# Patient Record
Sex: FEMALE | Race: BLACK OR AFRICAN AMERICAN
[De-identification: names, ages, dates, MRNs, and addresses within clinical notes are randomized per-mention and may not be internally consistent; named-entity substitution may affect disease eponyms.]

---

## 2018-02-02 ENCOUNTER — HOSPITAL ENCOUNTER (INPATIENT)
Dept: HOSPITAL 74 - JER | Age: 63
LOS: 12 days | Discharge: NURSING, REHAB OR LONG-TERM CARE | DRG: 871 | End: 2018-02-14
Attending: INTERNAL MEDICINE | Admitting: HOSPITALIST
Payer: COMMERCIAL

## 2018-02-02 VITALS — BODY MASS INDEX: 38.4 KG/M2

## 2018-02-02 DIAGNOSIS — K21.9: ICD-10-CM

## 2018-02-02 DIAGNOSIS — I12.0: ICD-10-CM

## 2018-02-02 DIAGNOSIS — M46.28: ICD-10-CM

## 2018-02-02 DIAGNOSIS — I69.351: ICD-10-CM

## 2018-02-02 DIAGNOSIS — N18.6: ICD-10-CM

## 2018-02-02 DIAGNOSIS — L89.154: ICD-10-CM

## 2018-02-02 DIAGNOSIS — A41.9: Primary | ICD-10-CM

## 2018-02-02 DIAGNOSIS — E83.52: ICD-10-CM

## 2018-02-02 DIAGNOSIS — E11.42: ICD-10-CM

## 2018-02-02 DIAGNOSIS — G40.909: ICD-10-CM

## 2018-02-02 DIAGNOSIS — N17.9: ICD-10-CM

## 2018-02-02 DIAGNOSIS — E11.22: ICD-10-CM

## 2018-02-02 DIAGNOSIS — D72.829: ICD-10-CM

## 2018-02-02 DIAGNOSIS — G92: ICD-10-CM

## 2018-02-02 DIAGNOSIS — R41.82: ICD-10-CM

## 2018-02-02 DIAGNOSIS — D50.9: ICD-10-CM

## 2018-02-02 DIAGNOSIS — E87.1: ICD-10-CM

## 2018-02-02 DIAGNOSIS — A04.72: ICD-10-CM

## 2018-02-02 LAB
ALBUMIN SERPL-MCNC: 2.8 G/DL (ref 3.4–5)
ALP SERPL-CCNC: 160 U/L (ref 45–117)
ALT SERPL-CCNC: 29 U/L (ref 12–78)
ANION GAP SERPL CALC-SCNC: 12 MMOL/L (ref 8–16)
APTT BLD: 36.5 SECONDS (ref 26.9–34.4)
AST SERPL-CCNC: 36 U/L (ref 15–37)
BASOPHILS # BLD: 0.9 % (ref 0–2)
BILIRUB SERPL-MCNC: 0.4 MG/DL (ref 0.2–1)
BUN SERPL-MCNC: 24 MG/DL (ref 7–18)
CALCIUM SERPL-MCNC: 9.2 MG/DL (ref 8.5–10.1)
CHLORIDE SERPL-SCNC: 92 MMOL/L (ref 98–107)
CO2 SERPL-SCNC: 28 MMOL/L (ref 21–32)
COHGB MFR BLD: 2 GM% (ref 0.5–2)
CREAT SERPL-MCNC: 2.3 MG/DL (ref 0.55–1.02)
DEPRECATED RDW RBC AUTO: 19.4 % (ref 11.6–15.6)
EOSINOPHIL # BLD: 1.3 % (ref 0–4.5)
GLUCOSE SERPL-MCNC: 83 MG/DL (ref 74–106)
HCT VFR BLD CALC: 22.9 % (ref 32.4–45.2)
HGB BLD-MCNC: 7.3 GM/DL (ref 10.7–15.3)
INR BLD: 1.36 (ref 0.82–1.09)
LYMPHOCYTES # BLD: 14.3 % (ref 8–40)
MCH RBC QN AUTO: 27.8 PG (ref 25.7–33.7)
MCHC RBC AUTO-ENTMCNC: 31.8 G/DL (ref 32–36)
MCV RBC: 87.5 FL (ref 80–96)
MONOCYTES # BLD AUTO: 4.3 % (ref 3.8–10.2)
NEUTROPHILS # BLD: 79.2 % (ref 42.8–82.8)
PH BLDV: 7.36 [PH] (ref 7.32–7.42)
PLATELET # BLD AUTO: 564 K/MM3 (ref 134–434)
PMV BLD: 7.6 FL (ref 7.5–11.1)
POTASSIUM SERPLBLD-SCNC: 3.5 MMOL/L (ref 3.5–5.1)
PROT SERPL-MCNC: 7.9 G/DL (ref 6.4–8.2)
PT PNL PPP: 15.4 SEC (ref 9.98–11.88)
RBC # BLD AUTO: 2.62 M/MM3 (ref 3.6–5.2)
SODIUM SERPL-SCNC: 132 MMOL/L (ref 136–145)
VENOUS PC02: 54.5 MMHG (ref 38–52)
VENOUS PO2: 33.8 MMHG (ref 28–48)
WBC # BLD AUTO: 10 K/MM3 (ref 4–10)

## 2018-02-02 PROCEDURE — P9038 RBC IRRADIATED: HCPCS

## 2018-02-02 PROCEDURE — G0480 DRUG TEST DEF 1-7 CLASSES: HCPCS

## 2018-02-02 PROCEDURE — P9058 RBC, L/R, CMV-NEG, IRRAD: HCPCS

## 2018-02-02 RX ADMIN — LEVETIRACETAM SCH: 100 SOLUTION ORAL at 23:30

## 2018-02-02 NOTE — HP
CHIEF COMPLAINT: facial grimacing , motor movement 





PCP:NO pcp (previous one Yohana Garcia [phone])





HISTORY OF PRESENT ILLNESS:


The patient is a 62 year old female with a significant PMH of ESRD (full 

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

, iron-deficiency anemia, Type 2 diabetes, enterocolitis d/t Clostridium 

difficile, GERD, and epilepsy who presents to the emergency department after 

rapid response from wound care today. The son is at bedside and providing most 

of the history. As per the son, the patient finished dialysis this morning and 

then went to a wound care appointment. At wound care, the patient started 

having facial movements and the son says these facial movements usually happen 

when something neurologic is going on. The son notes that the patient has not 

had any other changes in her mental status and was at her normal state of 

health yesterday. pt has a H/o schizophrenai and stop her psych meds 6 months 

ago.


 





ER course was notable for:


(1) CXR 


(2)Head CT 


(3)CBC, CMP , LA 


(4)Pan cx 


(5) vanco/zosyn





Recent Travel:denies 





PAST MEDICAL HISTORY: 


ESRD, HTN, SVC(with embolism and hemorrhage ), iron deficiency anemia , Type II 

DM, Enetrocolitis C.diff, epilepsy, Stage IV decupetus ulcer. 








Social History:


Smoking:denies 


Alcohol:denies 


Drugs: denies 





Family History:


Allergies





No Known Allergies Allergy (Verified 02/02/18 12:00)


 








HOME MEDICATIONS:


 Home Medications











 Medication  Instructions  Recorded


 


Acetaminophen 650 mg GT Q6H PRN 02/02/18


 


Amlodipine Besylate [Norvasc -] 2.5 mg GT DAILY 02/02/18


 


Apixaban [Eliquis] 5 mg GT BID 02/02/18


 


Collagenase Clostridium Hist. 1 applic TP DAILY 02/02/18





[Santyl]  


 


Insulin (LOG) Aspart [NovoLOG -] 0 units SQ BID 02/02/18


 


Insulin NPH Hum/Reg Insulin Hm 8 unit SQ DAILY 02/02/18





[Novolin 70-30 100 Unit/ml Vial]  


 


Ipratropium 0.02% Nebulizer 1 neb NEB Q4H 02/02/18





[Atrovent]  


 


Lactobacillus Acidophilus 1 each GT BID 02/02/18





[Acidophilus]  


 


Levetiracetam [Keppra Oral 500 mg GT Q12H 02/02/18





Solution -]  


 


Nystatin Powder [Nystop Topical 60 gm TP DAILY 02/02/18





Powder -]  


 


Paricalcitol 2 mcg IV DAILY 02/02/18


 


Polyvinyl Alcohol [Artificial 15 ml OP BID 02/02/18





Tears]  


 


Sevelamer Carbonate 800 mg GT Q8H 02/02/18


 


Vitamin B Complex/Folic Acid 2,000 mcg GT DAILY 02/02/18





[B-Stress Capsules]  








REVIEW OF SYSTEMS: obtained from son 


CONSTITUTIONAL: 


Absent:  fever, chills, diaphoresis, generalized weakness, malaise, loss of 

appetite, weight change


HEENT: 


Absent:  rhinorrhea, nasal congestion, throat pain, throat swelling, difficulty 

swallowing, mouth swelling, ear pain, eye pain, visual changes


CARDIOVASCULAR: 


Absent: chest pain, syncope, palpitations, irregular heart rate, lightheadedness

, peripheral edema


RESPIRATORY: 


Absent: cough, shortness of breath, dyspnea with exertion, orthopnea, wheezing, 

stridor, hemoptysis


GASTROINTESTINAL:


Absent: abdominal pain, abdominal distension, nausea, vomiting, diarrhea, 

constipation, melena, hematochezia


GENITOURINARY: 


Absent: dysuria, frequency, urgency, hesitancy, hematuria, flank pain, genital 

pain, anuria 


MUSCULOSKELETAL: 


Absent: myalgia, arthralgia, joint swelling, back pain, neck pain


SKIN: 


Absent: rash, itching, pallor


HEMATOLOGIC/IMMUNOLOGIC: 


Absent: easy bleeding, easy bruising, lymphadenopathy, frequent infections


ENDOCRINE:


Absent: unexplained weight gain, unexplained weight loss, heat intolerance, 

cold intolerance


NEUROLOGIC: 


Absent: headache, focal weakness or paresthesias, dizziness, unsteady gait, 

seizure, mental status changes, bladder or bowel incontinence


PSYCHIATRIC: 


Absent: anxiety, depression, suicidal or homicidal ideation, hallucinations.








PHYSICAL EXAMINATION


 Vital Signs - 24 hr











  02/02/18 02/02/18





  12:01 13:43


 


Temperature  100.0 F H


 


Pulse Rate 101 H 


 


Respiratory 22 





Rate  


 


Blood Pressure 126/68 


 


O2 Sat by Pulse 97 





Oximetry (%)  











GENERAL: awake , not alert not oriented , responce to verbal stimuli 


HEAD: Normal with no signs of trauma.


EYES:right pupil dilated non reactive deformed , left pupil reactive to light. 


ENT: dry mucous membrane 


NECK: supple 


LUNGS: Breath sounds equal, clear to auscultation bilaterally. No wheezes, and 

no crackles. No accessory muscle use.


HEART: Regular rate and rhythm, normal S1 and S2 ,systolic  murmur 2-3/6 LUSB, 

No rub or gallop.


ABDOMEN: Soft, nontender,  distended, normoactive bowel sounds, no guarding, no 

rebound, PEG in place 





UPPER EXTREMITIES: 1+ pulses, cold,  No cyanosis. No clubbing. No peripheral 

edema. fistula in left arm, IV line in right arm , 3/5 strength on the right , 

vs 5/5 strength on the left 


LOWER EXTREMITIES: 1+ pulses, cold , No calf tenderness. No peripheral edema. 

strength 3/5 on the right with 5/5 on the  left . 


NEUROLOGICAL: could not  perform due to AMS 





SKIN: Warm, dry, scar on the right arm, 10 cm x 8 cm dicupetus ulcer stage IV


 Laboratory Results - last 24 hr











  02/02/18 02/02/18 02/02/18





  13:25 14:39 14:39


 


WBC  Cancelled  


 


Corrected WBC (auto)  Cancelled  


 


RBC  Cancelled  


 


Hgb  Cancelled  


 


Hct  Cancelled  


 


MCV  Cancelled  


 


MCH  Cancelled  


 


MCHC  Cancelled  


 


RDW  Cancelled  


 


Plt Count  Cancelled  


 


MPV  Cancelled  


 


Neutrophils %  Cancelled  


 


Lymphocytes %  Cancelled  


 


Monocytes %  Cancelled  


 


Eosinophils %  Cancelled  


 


Basophils %  Cancelled  


 


Nucleated RBC %  Cancelled  


 


Platelet Estimate  Cancelled  


 


Platelet Comment  Cancelled  


 


PT with INR   15.40 H 


 


INR   1.36 H 


 


PTT (Actin FS)   36.5 H 


 


VBG pH    7.36


 


POC VBG pCO2    54.5 H


 


POC VBG pO2    33.8


 


Mixed VBG HCO3    30.1 H


 


Sodium   


 


Potassium   


 


Chloride   


 


Carbon Dioxide   


 


Anion Gap   


 


BUN   


 


Creatinine   


 


Creat Clearance w eGFR   


 


Random Glucose   


 


Lactic Acid   


 


Calcium   


 


Magnesium   


 


Total Bilirubin   


 


AST   


 


ALT   


 


Alkaline Phosphatase   


 


Creatine Kinase   


 


Creatine Kinase Index   


 


CK-MB (CK-2)   


 


Troponin I   


 


Total Protein   


 


Albumin   














  02/02/18 02/02/18 02/02/18





  14:39 14:39 15:00


 


WBC   


 


Corrected WBC (auto)   


 


RBC   


 


Hgb   


 


Hct   


 


MCV   


 


MCH   


 


MCHC   


 


RDW   


 


Plt Count   


 


MPV   


 


Neutrophils %   


 


Lymphocytes %   


 


Monocytes %   


 


Eosinophils %   


 


Basophils %   


 


Nucleated RBC %   


 


Platelet Estimate   


 


Platelet Comment   


 


PT with INR   


 


INR   


 


PTT (Actin FS)   


 


VBG pH   


 


POC VBG pCO2   


 


POC VBG pO2   


 


Mixed VBG HCO3   


 


Sodium  132 L  


 


Potassium  3.5  


 


Chloride  92 L  


 


Carbon Dioxide  28  


 


Anion Gap  12  


 


BUN  24 H  


 


Creatinine  2.3 H  


 


Creat Clearance w eGFR  21.49  


 


Random Glucose  83  


 


Lactic Acid    2.4 H*


 


Calcium  9.2  


 


Magnesium   Cancelled 


 


Total Bilirubin  0.4  


 


AST  36  


 


ALT  29  


 


Alkaline Phosphatase  160 H  


 


Creatine Kinase  448 H  


 


Creatine Kinase Index  1.2  


 


CK-MB (CK-2)  5.508 H  


 


Troponin I  < 0.02  Cancelled 


 


Total Protein  7.9  


 


Albumin  2.8 L  














  02/02/18





  15:00


 


WBC  10.0


 


Corrected WBC (auto) 


 


RBC  2.62 L


 


Hgb  7.3 L


 


Hct  22.9 L


 


MCV  87.5


 


MCH  27.8


 


MCHC  31.8 L


 


RDW  19.4 H


 


Plt Count  564 H


 


MPV  7.6


 


Neutrophils %  79.2


 


Lymphocytes %  14.3


 


Monocytes %  4.3


 


Eosinophils %  1.3


 


Basophils %  0.9


 


Nucleated RBC % 


 


Platelet Estimate 


 


Platelet Comment 


 


PT with INR 


 


INR 


 


PTT (Actin FS) 


 


VBG pH 


 


POC VBG pCO2 


 


POC VBG pO2 


 


Mixed VBG HCO3 


 


Sodium 


 


Potassium 


 


Chloride 


 


Carbon Dioxide 


 


Anion Gap 


 


BUN 


 


Creatinine 


 


Creat Clearance w eGFR 


 


Random Glucose 


 


Lactic Acid 


 


Calcium 


 


Magnesium 


 


Total Bilirubin 


 


AST 


 


ALT 


 


Alkaline Phosphatase 


 


Creatine Kinase 


 


Creatine Kinase Index 


 


CK-MB (CK-2) 


 


Troponin I 


 


Total Protein 


 


Albumin 





 CBC, BMP





 02/02/18 15:00 





 02/02/18 14:39 








CXR : weak inspiration with promenent mediastenum.





CT head : moderate atrophy , NO intracranial lesions or hemorrhage.  


ASSESSMENT/PLAN:


 62 year old female with a significant PMH of ESRD on HD, HTN, SVC syndrome 

complicated by embolism and hemorrhage, iron-deficiency anemia, Type 2 diabetes

, enterocolitis d/t Clostridium difficile, GERD, and epilepsy who presents to 

the emergency department after rapid response from wound care. was found to 

have sepsis ad admitted to med-surg for further evaluation.





# Sepsis likely 2/2 decubitus ulcer vs unknown source 


* Pan cx (blood, urine , sputum, wound )


* influenza swap rapid 


* Vanco/zosyn in ED 


* ID on board 


* repeat CBC, CMP 


* LA 2.4 repeat  LA 


* Tylenol for fever


* IV fluids boluses with 75 CC/hr maintenance 


* 


# AMS likely 2/2 sepsis vs volume depletion vs electrolytes imbalance vs 

seizure vs CVA 


* Head CT negative 


* H/O seizure contine home meds 


* replinished elctrolytes 


* IV fluids NS 2 L bolus , maintenance 100 CC/hr 


* 


# Hyponatremia 2/2 low oral intake 


* NS IV fluids 


* repeat lab 


* 


# ESSENCE on CKD on HD 2/2 sepsis vs pre renal 


* BUN/ CR 24/2.3 , had HD today 


* IV fluids, ABX 


* consult nephrology 


* Avoid nephrotoxic agenst 


* nephrology consult 


* c/w HD schedule 


* repeat BuN/Cr 


* 


# Stage IV decubitus ulcer 


* wound care 


* contiue abx 


* wound cx 


# Prolonges QT 


* avoid meds that cause prolongtion (zofran, levaquin, ...)


# HTN 


* hold home meds due to sepsis 


* 


# Type II DM 


* Hold home meds 


* ISS 


* BGM TID 


* npo for now , diabetic diet low sodium diet later 


* 


# seizure 


* Continue home meds


* Consult neurology 


* 


#  FEN 


* F: NS bolusses with maintenance 


* E: replenished , monitor 


* N: NPO , later diabetic , low sodium diet through PEG


* 


# Proph 


* DVT : SCDS both legs 


* GI: protonix 40 mg daily 


* 


# Dispo 


* Admit to Med-surg 








Visit type





- Emergency Visit


Emergency Visit: Yes


ED Registration Date: 02/02/18


Care time: The patient presented to the Emergency Department on the above date 

and was hospitalized for further evaluation of their emergent condition.





- New Patient


This patient is new to me today: Yes


Date on this admission: 02/04/18





- Critical Care


Critical Care patient: No

## 2018-02-02 NOTE — PDOC
History of Present Illness





- General


History Source: Family (son)


Exam Limitations: Clinical Condition





- History of Present Illness


Initial Comments: 





02/02/18 13:25


The patient is a 62 year old female with a significant PMH of ESRD (full 

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

, iron-deficiency anemia, Type 2 diabetes, enterocolitis d/t Clostridium 

difficile, GERD, and epilepsy who presents to the emergency department after 

rapid response from wound care today. The son is at bedside and providing most 

of the history. As per the son, the patient finished dialysis this morning and 

then went to a wound care appointment. At wound care, the patient started 

having facial movements and the son says these facial movements usually happen 

when something neurologic is going on. The son notes that the patient has not 

had any other changes in her mental status and was at her normal state of 

health yesterday. 


 


Allergies: NKA





<Shelley Lal - Last Filed: 02/02/18 13:25>





<Pilar Mancilla - Last Filed: 02/02/18 18:27>





- General


Chief Complaint: Altered Mental Status


Stated Complaint: AMS


Time Seen by Provider: 02/02/18 12:00





Past History





<Shelley Lal - Last Filed: 02/02/18 13:25>





- Past Medical History


Anemia: Yes


COPD: No


Diabetes: Yes


Dialysis: Yes


HTN: Yes


Psychiatric Problems: Yes (schizophrenia)





- Suicide/Smoking/Psychosocial Hx


Smoking History: Unknown if ever smoked





<Pilar Mancilla - Last Filed: 02/02/18 18:27>





- Past Medical History


Allergies/Adverse Reactions: 


 Allergies











Allergy/AdvReac Type Severity Reaction Status Date / Time


 


No Known Allergies Allergy   Verified 02/02/18 12:00











Home Medications: 


Ambulatory Orders





Acetaminophen 650 mg GT Q6H PRN 02/02/18 


Amlodipine Besylate [Norvasc -] 2.5 mg GT DAILY 02/02/18 


Apixaban [Eliquis] 5 mg GT BID 02/02/18 


Collagenase Clostridium Hist. [Santyl] 1 applic TP DAILY 02/02/18 


Insulin (LOG) Aspart [NovoLOG -] 0 units SQ BID 02/02/18 


Insulin NPH Hum/Reg Insulin Hm [Novolin 70-30 100 Unit/ml Vial] 8 unit SQ DAILY 

02/02/18 


Ipratropium 0.02% Nebulizer [Atrovent] 1 neb NEB Q4H 02/02/18 


Lactobacillus Acidophilus [Acidophilus] 1 each GT BID 02/02/18 


Levetiracetam [Keppra Oral Solution -] 500 mg GT Q12H 02/02/18 


Nystatin Powder [Nystop Topical Powder -] 60 gm TP DAILY 02/02/18 


Paricalcitol 2 mcg IV DAILY 02/02/18 


Polyvinyl Alcohol [Artificial Tears] 15 ml OP BID 02/02/18 


Sevelamer Carbonate 800 mg GT Q8H 02/02/18 


Vitamin B Complex/Folic Acid [B-Stress Capsules] 2,000 mcg GT DAILY 02/02/18 











**Review of Systems





- Review of Systems


Able to Perform ROS?: No





<Shelley Lal - Last Filed: 02/02/18 13:25>





*Physical Exam





- Vital Signs


 Last Vital Signs











Temp Pulse Resp BP Pulse Ox


 


    101 H  22   126/68   97 


 


    02/02/18 12:01  02/02/18 12:01  02/02/18 12:01  02/02/18 12:01














- Physical Exam


Comments: 





02/02/18 13:26


GENERAL: Awake, alert, and fully oriented, in no acute distress


HEAD: No signs of trauma


EYES: PERRLA, EOMI, sclera anicteric, conjunctiva clear


ENT: Auricles normal inspection, hearing grossly normal, nares patent, 

oropharynx clear without exudates. Moist mucosa


NECK: Normal ROM, supple, no lymphadenopathy, JVD, or masses


LUNGS: Breath sounds equal, clear to auscultation bilaterally.  No wheezes, and 

no crackles


HEART: Regular rate and rhythm, normal S1 and S2, no murmurs, rubs or gallops


ABDOMEN: Soft, nontender, normoactive bowel sounds.  No guarding, no rebound.  

No masses


EXTREMITIES: Normal range of motion, no edema.  No clubbing or cyanosis. No 

cords, erythema, or tenderness


NEUROLOGICAL:  Normal speech, cranial nerves intact, negative pronator drift, 5/

5 strength in all 4 extremities, normal sensation to light touch in all 4 

extremities, normal cerebellar exam, normal gait, normal reflexes and tone


SKIN: Warm, Dry, normal turgor, no rashes or lesions noted.





<Shelley Lal - Last Filed: 02/02/18 13:25>





- Vital Signs


 Last Vital Signs











Temp Pulse Resp BP Pulse Ox


 


    101 H  22   126/68   97 


 


    02/02/18 12:01  02/02/18 12:01  02/02/18 12:01  02/02/18 12:01














<Pilar Mancilla - Last Filed: 02/02/18 18:27>





ED Treatment Course





- LABORATORY


CBC & Chemistry Diagram: 


 02/02/18 15:00





 02/02/18 14:39





- RADIOLOGY


Radiology Studies Ordered: 














 Category Date Time Status


 


 HEAD CT WITHOUT CONTRAST [CT] Stat CT Scan  02/02/18 12:01 Ordered


 


 CHEST X-RAY PORTABLE* [RAD] Stat Radiology  02/02/18 12:00 Completed














<Pilar Mancilla - Last Filed: 02/02/18 18:27>





Medical Decision Making





- Medical Decision Making





02/02/18 14:17


62-year-old female history of SVC syndrome complicated by emboli and hemorrhage

, nursing home bound, stage IV sacral decubitus ulcer, epilepsy presents to the 

emergency department with altered mental status from wound clinic. Son notes a 

common symptoms of her face that he states are only present when something 

neurologic is occurring. Vitals remarkable for tachycardia to the low 100s. 

Exam remarkable left repetitive ace movements, stage IV sacral decubitus ulcer, 

and low-grade temperature to 100 rectally. Differential for her altered mental 

status includes but is not limited to infection versus seizures versus ischemia 

versus metabolic disarray. Will do a sepsis workup as well as a CTH and obtain 

cardiac enzymes for further evaluation and likely admit.





02/02/18 18:25


CT head with no acute change. Patient treated with Vanc and Zosyn for likely 

infection due to stage IV sacral decubitus ulcer. Patient has been signed out 

to resident Dr. Heredia in person in the ED and has been accepted for admission 

to a [facility] inpatient bed under attending Dr. Lugo.





Case discussed in detail with admitting physician including history, physical 

exam and ancillary studies.





Admitting physician has assumed care for the patient, will follow all pending 

diagnostics and will complete the evaluation and treatment.  





<Pilar Mancilla - Last Filed: 02/02/18 18:27>





*DC/Admit/Observation/Transfer





- Attestations


Scribe Attestion: 





02/02/18 13:26





Documentation prepared by Shelley Lal, acting as medical scribe for Pilar Mancilla MD.





<Shelley Lal - Last Filed: 02/02/18 13:25>





- Discharge Dispostion


Admit: Yes





- Attestations


Physician Attestion: 





02/02/18 18:27








I, Dr. Pilar Mancilla MD, attest that this document has been prepared under my 

direction and personally reviewed by me in its entirety.   I further attest, 

that it accurately reflects all work, treatment, procedures and medical decision

-making performed by me.  





<Pilar Mancilla - Last Filed: 02/02/18 18:27>


Diagnosis at time of Disposition: 


 Sepsis








- Discharge Dispostion


Condition at time of disposition: Stable

## 2018-02-03 LAB
ALBUMIN SERPL-MCNC: 2.6 G/DL (ref 3.4–5)
ALP SERPL-CCNC: 149 U/L (ref 45–117)
ALT SERPL-CCNC: 22 U/L (ref 12–78)
ANION GAP SERPL CALC-SCNC: 12 MMOL/L (ref 8–16)
ANISOCYTOSIS BLD QL: (no result)
APTT BLD: 36.8 SECONDS (ref 26.9–34.4)
ARTERIAL BLOOD GAS PCO2: 39.2 MMHG (ref 35–45)
ARTERIAL PATENCY WRIST A: POSITIVE
AST SERPL-CCNC: 25 U/L (ref 15–37)
BASE EXCESS BLDA CALC-SCNC: 6.1 MEQ/L (ref -2–2)
BASO STIPL BLD QL SMEAR: (no result)
BASOPHILS # BLD: 0.6 % (ref 0–2)
BASOPHILS # BLD: 0.7 % (ref 0–2)
BILIRUB SERPL-MCNC: 0.6 MG/DL (ref 0.2–1)
BUN SERPL-MCNC: 32 MG/DL (ref 7–18)
CALCIUM SERPL-MCNC: 9 MG/DL (ref 8.5–10.1)
CHLORIDE SERPL-SCNC: 94 MMOL/L (ref 98–107)
CO2 SERPL-SCNC: 27 MMOL/L (ref 21–32)
CREAT SERPL-MCNC: 3.3 MG/DL (ref 0.55–1.02)
DEPRECATED RDW RBC AUTO: 18.1 % (ref 11.6–15.6)
DEPRECATED RDW RBC AUTO: 19.2 % (ref 11.6–15.6)
DEPRECATED RDW RBC AUTO: 19.5 % (ref 11.6–15.6)
EOSINOPHIL # BLD: 1.3 % (ref 0–4.5)
EOSINOPHIL # BLD: 1.3 % (ref 0–4.5)
GLUCOSE SERPL-MCNC: 122 MG/DL (ref 74–106)
HCT VFR BLD CALC: 20.9 % (ref 32.4–45.2)
HCT VFR BLD CALC: 21.2 % (ref 32.4–45.2)
HCT VFR BLD CALC: 24.1 % (ref 32.4–45.2)
HGB BLD-MCNC: 6.5 GM/DL (ref 10.7–15.3)
HGB BLD-MCNC: 6.6 GM/DL (ref 10.7–15.3)
HGB BLD-MCNC: 7.6 GM/DL (ref 10.7–15.3)
INR BLD: 1.42 (ref 0.82–1.09)
LYMPHOCYTES # BLD: 14.5 % (ref 8–40)
LYMPHOCYTES # BLD: 18.8 % (ref 8–40)
MAGNESIUM SERPL-MCNC: 2.3 MG/DL (ref 1.8–2.4)
MCH RBC QN AUTO: 26.9 PG (ref 25.7–33.7)
MCH RBC QN AUTO: 27.1 PG (ref 25.7–33.7)
MCH RBC QN AUTO: 27.6 PG (ref 25.7–33.7)
MCHC RBC AUTO-ENTMCNC: 30.9 G/DL (ref 32–36)
MCHC RBC AUTO-ENTMCNC: 31 G/DL (ref 32–36)
MCHC RBC AUTO-ENTMCNC: 31.3 G/DL (ref 32–36)
MCV RBC: 86.9 FL (ref 80–96)
MCV RBC: 87.6 FL (ref 80–96)
MCV RBC: 88.4 FL (ref 80–96)
MONOCYTES # BLD AUTO: 5.9 % (ref 3.8–10.2)
MONOCYTES # BLD AUTO: 6.4 % (ref 3.8–10.2)
NEUTROPHILS # BLD: 72.8 % (ref 42.8–82.8)
NEUTROPHILS # BLD: 77.7 % (ref 42.8–82.8)
PHOSPHATE SERPL-MCNC: 2 MG/DL (ref 2.5–4.9)
PLATELET # BLD AUTO: 505 K/MM3 (ref 134–434)
PLATELET # BLD AUTO: 537 K/MM3 (ref 134–434)
PLATELET # BLD AUTO: 568 K/MM3 (ref 134–434)
PLATELET BLD QL SMEAR: (no result)
PMV BLD: 6.5 FL (ref 7.5–11.1)
PMV BLD: 7 FL (ref 7.5–11.1)
PMV BLD: 7 FL (ref 7.5–11.1)
PO2 BLDA: 76.1 MMHG (ref 80–100)
POTASSIUM SERPLBLD-SCNC: 3.3 MMOL/L (ref 3.5–5.1)
PROT SERPL-MCNC: 7.1 G/DL (ref 6.4–8.2)
PT PNL PPP: 16.1 SEC (ref 9.98–11.88)
RBC # BLD AUTO: 2.41 M/MM3 (ref 3.6–5.2)
RBC # BLD AUTO: 2.42 M/MM3 (ref 3.6–5.2)
RBC # BLD AUTO: 2.73 M/MM3 (ref 3.6–5.2)
RBC MORPH BLD: YES
SAO2 % BLDA: 96.6 % (ref 90–98.9)
SODIUM SERPL-SCNC: 133 MMOL/L (ref 136–145)
WBC # BLD AUTO: 10.1 K/MM3 (ref 4–10)
WBC # BLD AUTO: 10.4 K/MM3 (ref 4–10)
WBC # BLD AUTO: 11.7 K/MM3 (ref 4–10)

## 2018-02-03 PROCEDURE — 30233N1 TRANSFUSION OF NONAUTOLOGOUS RED BLOOD CELLS INTO PERIPHERAL VEIN, PERCUTANEOUS APPROACH: ICD-10-PCS | Performed by: INTERNAL MEDICINE

## 2018-02-03 RX ADMIN — LEVETIRACETAM SCH MG: 100 SOLUTION ORAL at 10:55

## 2018-02-03 RX ADMIN — TAZOBACTAM SODIUM AND PIPERACILLIN SODIUM SCH MLS/HR: 250; 2 INJECTION, SOLUTION INTRAVENOUS at 12:28

## 2018-02-03 RX ADMIN — VITAMIN C SCH EACH: TAB at 10:54

## 2018-02-03 RX ADMIN — POLYVINYL ALCOHOL SCH DROP: 14 SOLUTION/ DROPS OPHTHALMIC at 23:03

## 2018-02-03 RX ADMIN — Medication SCH TAB: at 10:54

## 2018-02-03 RX ADMIN — APIXABAN SCH MG: 5 TABLET, FILM COATED ORAL at 10:54

## 2018-02-03 RX ADMIN — POLYVINYL ALCOHOL SCH DROP: 14 SOLUTION/ DROPS OPHTHALMIC at 10:54

## 2018-02-03 RX ADMIN — Medication SCH TAB: at 23:04

## 2018-02-03 RX ADMIN — INSULIN ASPART SCH: 100 INJECTION, SOLUTION INTRAVENOUS; SUBCUTANEOUS at 06:23

## 2018-02-03 RX ADMIN — APIXABAN SCH MG: 5 TABLET, FILM COATED ORAL at 23:04

## 2018-02-03 RX ADMIN — NYSTATIN SCH APPLIC: 100000 POWDER TOPICAL at 10:54

## 2018-02-03 RX ADMIN — INSULIN ASPART SCH: 100 INJECTION, SOLUTION INTRAVENOUS; SUBCUTANEOUS at 19:38

## 2018-02-03 RX ADMIN — COLLAGENASE SANTYL SCH APPLIC: 250 OINTMENT TOPICAL at 10:55

## 2018-02-03 RX ADMIN — INSULIN ASPART SCH: 100 INJECTION, SOLUTION INTRAVENOUS; SUBCUTANEOUS at 11:46

## 2018-02-03 RX ADMIN — TAZOBACTAM SODIUM AND PIPERACILLIN SODIUM SCH MLS/HR: 250; 2 INJECTION, SOLUTION INTRAVENOUS at 22:07

## 2018-02-03 RX ADMIN — LEVETIRACETAM SCH MG: 100 SOLUTION ORAL at 23:03

## 2018-02-03 NOTE — CONSULT
Consult


Consult Specialty:: Nephrology


Reason for Consultation:: ESRD on HD





- History of Present Illness


Chief Complaint: had a change in mental status while in wound care


History of Present Illness: 





Pt is a 62 year old female with pmhx of ESRD, HTN, SVC syndrome, anemia, DM, 

c.diff, epilepsy and GERD who was sent to the ER after a rapid response was 

called in wound care. She had facial twitching and was not responsive. I was 

called to evaluate her as she is an HD pt. She goes to dialysis in [facility]. Her 

last dialysis session was Friday morning. She was also found to be anemic. Pt 

is not waking up. Her family are at bedside and care was discussed with them. 





- History Source


History Provided By: Medical Record





- Past Medical History


CNS: Yes: CVA, Seizure


Cardio/Vascular: Yes: HTN


Renal/: Yes: Hemodialysis


Heme/Onc: Yes: Anemia


Endocrine: Yes: Diabetes Mellitus


Additional Medical History: obesity





- Past Surgical History


Past Surgical History: Yes: AV Fistula/Graft





- Smoking History


Smoking history: Unknown if ever smoked





Home Medications





- Allergies


Allergies/Adverse Reactions: 


 Allergies











Allergy/AdvReac Type Severity Reaction Status Date / Time


 


No Known Allergies Allergy   Verified 02/02/18 12:00














- Home Medications


Home Medications: 


Ambulatory Orders





Acetaminophen 650 mg GT Q6H PRN 02/02/18 


Amlodipine Besylate [Norvasc -] 2.5 mg GT DAILY 02/02/18 


Apixaban [Eliquis] 5 mg GT BID 02/02/18 


Collagenase Clostridium Hist. [Santyl] 1 applic TP DAILY 02/02/18 


Insulin (LOG) Aspart [NovoLOG -] 0 units SQ BID 02/02/18 


Insulin NPH Hum/Reg Insulin Hm [Novolin 70-30 100 Unit/ml Vial] 8 unit SQ DAILY 

02/02/18 


Ipratropium 0.02% Nebulizer [Atrovent] 1 neb NEB Q4H 02/02/18 


Lactobacillus Acidophilus [Acidophilus] 1 each GT BID 02/02/18 


Levetiracetam [Keppra Oral Solution -] 500 mg GT Q12H 02/02/18 


Nystatin Powder [Nystop Topical Powder -] 60 gm TP DAILY 02/02/18 


Paricalcitol 2 mcg IV DAILY 02/02/18 


Polyvinyl Alcohol [Artificial Tears] 15 ml OP BID 02/02/18 


Sevelamer Carbonate 800 mg GT Q8H 02/02/18 


Vitamin B Complex/Folic Acid [B-Stress Capsules] 2,000 mcg GT DAILY 02/02/18 











Family Disease History





- Family Disease History


Family History: Unable to Obtain





Review of Systems


Unable to obtain ROS, reason: pt lethargic





Physical Exam


Vital Signs: 


 Vital Signs











Temperature  99.6 F   02/03/18 14:34


 


Pulse Rate  95 H  02/03/18 13:39


 


Respiratory Rate  20   02/03/18 13:39


 


Blood Pressure  114/49   02/03/18 13:39


 


O2 Sat by Pulse Oximetry (%)  91 L  02/02/18 23:00











Constitutional: Yes: Calm


Eyes: Yes: Conjunctiva Clear


Cardiovascular: Yes: S1, S2


Respiratory: Yes: CTA Bilaterally


Gastrointestinal: Yes: Soft, Abdomen, Obese


Renal/: Yes: Incontinence


Musculoskeletal: Yes: Muscle Weakness


Extremities: Yes: Other (fistula with thrill and bruit)


Edema: Yes


Neurological: Yes: Lethargy


Labs: 


 CBC, BMP





 02/03/18 08:54 





 02/03/18 06:10 





 Laboratory Tests











  02/02/18 02/02/18 02/03/18





  14:39 15:00 06:00


 


RBC   


 


Hgb   7.3 L 


 


ABG pH    7.49 H


 


ABG pCO2 at Pt Temp    39.2


 


ABG HCO3    29.6 H


 


Sodium   


 


Potassium   


 


Chloride   


 


Carbon Dioxide   


 


Anion Gap   


 


BUN  24 H  


 


Creatinine  2.3 H  














  02/03/18 02/03/18 02/03/18





  06:10 06:10 08:54


 


RBC    2.41 L


 


Hgb  6.6 L*  


 


ABG pH   


 


ABG pCO2 at Pt Temp   


 


ABG HCO3   


 


Sodium   133 L 


 


Potassium   3.3 L 


 


Chloride   94 L 


 


Carbon Dioxide   27 


 


Anion Gap   12 


 


BUN   32 H 


 


Creatinine   3.3 H 














Imaging





- Results


Chest X-ray: Report Reviewed


Cat Scan: Report Reviewed





Problem List





- Problems


(1) Decubital ulcer


Code(s): L89.90 - PRESSURE ULCER OF UNSPECIFIED SITE, UNSPECIFIED STAGE   





(2) ESRD (end stage renal disease)


Code(s): N18.6 - END STAGE RENAL DISEASE   





(3) Sepsis


Code(s): A41.9 - SEPSIS, UNSPECIFIED ORGANISM   





Assessment/Plan





 Current Medications











Generic Name Dose Route Start Last Admin





  Trade Name Freq  PRN Reason Stop Dose Admin


 


Acetaminophen  650 mg  02/02/18 19:22  





  Tylenol -  PO   





  Q4H PRN   





  FEVER   


 


Apixaban  5 mg  02/03/18 10:00  02/03/18 10:54





  Eliquis -  PO   5 mg





  BID KEELY   Administration


 


Artificial Tears  1 drop  02/03/18 10:00  02/03/18 10:54





  Artificial Tears  OD   1 drop





  BID KEELY   Administration


 


Collagenase  1 applic  02/03/18 10:00  02/03/18 10:55





  Santyl -  TP   1 applic





  DAILY KEELY   Administration


 


Sodium Chloride  1,000 mls @ 75 mls/hr  02/02/18 22:56  02/02/18 23:30





  Normal Saline -  IV   Not Given





  ASDIR KEELY   


 


Piperacillin/Tazobactam/Dextrose  2.25 gm in 50 mls @ 100 mls/hr  02/03/18 10:

00  02/03/18 12:28





  Zosyn 2.25gm Ivpb (Premix)  IVPB   100 mls/hr





  BID KEELY   Administration





  Protocol   


 


Insulin Aspart  1 vial  02/03/18 07:00  02/03/18 11:46





  Novolog Vial Sliding Scale -  SQ   Not Given





  TIDAC [facility]   





  Protocol   


 


Ipratropium Bromide  1 amp  02/02/18 23:00  





  Atrovent 0.02% Nebulizer -  NEB   





  Q4H PRN   





  SHORTNESS OF BREATH   


 


Lactobacillus Acidophilus  1 tab  02/03/18 10:00  02/03/18 10:54





  Bacid -  GT   1 tab





  BID KEELY   Administration


 


Levetiracetam  500 mg  02/02/18 23:00  02/03/18 10:55





  Keppra Oral Solution -  GT   500 mg





  BID KEELY   Administration


 


Multivitamins  1 each  02/03/18 10:00  02/03/18 10:54





  Total B With C -  PO   1 each





  DAILY KEELY   Administration


 


Nystatin  1 applic  02/03/18 10:00  02/03/18 10:54





  Nystop Powder -  TP   1 applic





  DAILY KEELY   Administration


 


Paricalcitol  2 mcg  02/05/18 10:00  





  Zemplar -  IVPUSH   





  [id number] [facility]   











Impression


1. ESRD


2. anemia


3. change in mental status


4. epilepsy


5. htn


6. dm


7. gerd


8. obesity





Plan


- next HD on Monday


- can transfuse PRBC


- resume home meds


- neuro eval


- renal dose abx


- will follow


Dr Young

## 2018-02-03 NOTE — EKG
Test Reason : 

Blood Pressure : ***/*** mmHG

Vent. Rate : 091 BPM     Atrial Rate : 091 BPM

   P-R Int : 158 ms          QRS Dur : 070 ms

    QT Int : 402 ms       P-R-T Axes : [phone] degrees

   QTc Int : 494 ms

 

NORMAL SINUS RHYTHM

PROLONGED QT

ABNORMAL ECG

WHEN COMPARED WITH ECG OF 02-FEB-2018 14:23,

CLINICAL CORRELATION IS RECOMMENDED

Confirmed by ROMINA THOMPSON, TIANNA ([id number]) on 2/3/2018 12:56:17 PM

 

Referred By: MARISOL BURR           Confirmed By:TIANNA VANEGAS MD

## 2018-02-03 NOTE — CONS
INFECTIOUS DISEASE CONSULTATION

 

DATE OF CONSULTATION:

 

DATE OF DICTATION:  02/03/2018

 

REASON FOR CONSULTATION:  This is a 62-year-old female from a skilled care facility,

with end-stage renal disease, brought after a rapid response was noted in Wound Care

yesterday, where she has her decubitus ulcer followed.  According to the notes, as

the patient can give no history, her son was there at the time and noted what was

described as facial movements, possible seizures, in this 62-year-old who has a

pre-existing seizure disorder.  She was empirically treated for sepsis after she

developed fever and mildly elevated WBC count.  She is on dialysis through a fistula

in her left antecubital area.  This morning, blood cultures are thus far no growth. 

She has a large sacral decubitus which has been chronic and followed in the wound

care center.

 

PAST MEDICAL HISTORY:  Includes end-stage renal disease, hypertension, SVC syndrome

with embolism, iron deficiency anemia, type 2 diabetes, and history of C. difficile

infection.

 

SOCIAL HISTORY:  Nonsmoker.  No history of alcohol use.

 

MEDICATIONS:  Include amlodipine, Eliquis, insulin, Keppra.

 

FAMILY HISTORY:  Unobtainable.

 

REVIEW OF SYSTEMS:

Respiratory:  No cough or shortness of breath.

Cardiac:  No history of chest pain, syncope, palpitations, murmur.

Gastrointestinal:  No obvious diarrhea, vomiting, blood per rectum.

Genitourinary:  End-stage renal disease, incontinent of any urine.

 

PHYSICAL EXAMINATION:

General:  She was a heavy-set woman in no acute distress.

Vital Signs:  The weight was 260 pounds.  Temperature now 98.4, pulse 99, blood

pressure 127/68, respirations 20.

Lungs:  Clear.

Heart:  S1, S2.  Regular rhythm.

Abdomen:  Soft with no localized tenderness, guarding, rebound.

Extremities:  Revealed an AV fistula in the left arm, which did not appear infected.

Skin:  Revealed a stage 4 sacral decubitus which was packed with bloody drainage and

no surrounding cellulitis seen.

 

DIAGNOSTIC DATA:  White count was 11.7 with a hemoglobin of 6.5, platelets of 568. 

BUN of 32, creatinine 3.3.  Alkaline phosphatase 149.  O2 saturation on admission

91%.

 

Chest x-ray shows no obvious acute infiltrate present.

 

ASSESSMENT:  A 62-year-old female with pre-existing seizure disorder, rapid response

noted on wound care after she apparently developed what appeared to be facial

twitching which could have represented seizure activity.  She is on Keppra.  She has

low-grade fever with mildly elevated white count, and the possibility of infection is

considered.  She does not appear to meet criteria for sepsis.  Agree that she should

be treated pending final blood cultures with vancomycin dosed based on body weight,

along with piperacillin/tazobactam based on her end-stage renal disease.  Case was

discussed with Dr. Lowe.  A surgical consult with Dr. Heredia to evaluate the

decubitus ulcer has been requested, along with neurology consult for further

evaluation of chronic seizure disorder.

 

 

STAS MEHTA M.D.

 

LA [id number]

DD: 02/03/2018 09:45

DT: 02/03/2018 10:16

Job #:  [id number]

## 2018-02-03 NOTE — PN
Physical Exam: 


SUBJECTIVE: Patient seen and examined





Comfortable with no acute distress, feeling better.


OBJECTIVE:


 Vital Signs











Temperature  98.4 F   02/03/18 05:49


 


Pulse Rate  99 H  02/03/18 05:49


 


Respiratory Rate  20   02/03/18 05:49


 


Blood Pressure  127/68   02/03/18 05:49


 


O2 Sat by Pulse Oximetry (%)  91 L  02/02/18 23:00











 





GENERAL: The patient is awake, alert, slight difficulty with speech.


HEAD: Normal with no signs of trauma.


EYES: PERRL, extraocular movements intact, sclera anicteric, conjunctiva clear. 


ENT: Ears normal, oropharynx clear without exudates, moist mucous membranes.


NECK: Trachea midline, full range of motion, supple. 


LUNGS: Breath sounds equal, clear to auscultation bilaterally, no wheezes, no 

crackles, no accessory muscle use. 


HEART: SEM3/6 RRR, 


ABDOMEN: Soft, nontender, nondistended, normoactive bowel sounds, no guarding, 

no rebound, no hepatosplenomegaly, no masses.


EXTREMITIES: 2+ pulses, warm, well-perfused, no edema. 


NEUROLOGICAL: Cranial nerves II through XII grossly intact. Normal speech, gait 

not observed.


PSYCH: Normal mood, normal affect.


SKIN: Warm, dry, normal turgor, no rashes or lesions noted


Sacral decubitus: stage IV very deep, wound is clean.


 CBCD











WBC  11.7 K/mm3 (4.0-10.0)  H  02/03/18  08:54    


 


RBC  2.41 M/mm3 (3.60-5.2)  L  02/03/18  08:54    


 


Hgb  6.5 GM/dL (10.7-15.3)  L*  02/03/18  08:54    


 


Hct  20.9 % (32.4-45.2)  L  02/03/18  08:54    


 


MCV  86.9 fl (80-96)   02/03/18  08:54    


 


MCHC  31.0 g/dl (32.0-36.0)  L  02/03/18  08:54    


 


RDW  19.5 % (11.6-15.6)  H  02/03/18  08:54    


 


Plt Count  568 K/MM3 (134-434)  H  02/03/18  08:54    


 


MPV  6.5 fl (7.5-11.1)  L  02/03/18  08:54    








 CMP











Sodium  133 mmol/L (136-145)  L  02/03/18  06:10    


 


Potassium  3.3 mmol/L (3.5-5.1)  L  02/03/18  06:10    


 


Chloride  94 mmol/L ()  L  02/03/18  06:10    


 


Carbon Dioxide  27 mmol/L (21-32)   02/03/18  06:10    


 


Anion Gap  12  (8-16)   02/03/18  06:10    


 


BUN  32 mg/dL (7-18)  H  02/03/18  06:10    


 


Creatinine  3.3 mg/dL (0.55-1.02)  H  02/03/18  06:10    


 


Creat Clearance w eGFR  14.17  (>60)   02/03/18  06:10    


 


Random Glucose  122 mg/dL ()  H  02/03/18  06:10    


 


Calcium  9.0 mg/dL (8.5-10.1)   02/03/18  06:10    


 


Total Bilirubin  0.6 mg/dL (0.2-1.0)  D 02/03/18  06:10    


 


AST  25 U/L (15-37)   02/03/18  06:10    


 


ALT  22 U/L (12-78)   02/03/18  06:10    


 


Alkaline Phosphatase  149 U/L ()  H  02/03/18  06:10    


 


Total Protein  7.1 g/dl (6.4-8.2)   02/03/18  06:10    


 


Albumin  2.6 g/dl (3.4-5.0)  L  02/03/18  06:10    








 CARDIAC ENZYMES











Creatine Kinase  448 IU/L ()  H  02/02/18  14:39    


 


Troponin I  < 0.02 ng/ml (0.00-0.05)   02/02/18  14:39    

















 


Active Medications











Generic Name Dose Route Start Last Admin





  Trade Name Freq  PRN Reason Stop Dose Admin


 


Acetaminophen  650 mg  02/02/18 19:22  





  Tylenol -  PO   





  Q4H PRN   





  FEVER   


 


Apixaban  5 mg  02/03/18 10:00  





  Eliquis -  PO   





  BID KEELY   


 


Artificial Tears  1 drop  02/03/18 10:00  





  Artificial Tears  OD   





  BID KEELY   


 


Collagenase  1 applic  02/03/18 10:00  





  Santyl -  TP   





  DAILY KEELY   


 


Sodium Chloride  1,000 mls @ 75 mls/hr  02/02/18 22:56  02/02/18 23:30





  Normal Saline -  IV   Not Given





  ASDIR [facility]   


 


Vancomycin HCl 1 mg/ Dextrose  250 mls @ 250 mls/hr  02/03/18 09:45  





  IVPB  02/03/18 10:44  





  ONCE ONE   





  Protocol   


 


Piperacillin/Tazobactam/Dextrose  2.25 gm in 50 mls @ 100 mls/hr  02/03/18 10:

00  





  Zosyn 2.25gm Ivpb (Premix)  IVPB   





  BID [facility]   





  Protocol   


 


Insulin Aspart  1 vial  02/03/18 07:00  02/03/18 06:23





  Novolog Vial Sliding Scale -  SQ   Not Given





  TIDAC [facility]   





  Protocol   


 


Ipratropium Bromide  1 amp  02/02/18 23:00  





  Atrovent 0.02% Nebulizer -  NEB   





  Q4H PRN   





  SHORTNESS OF BREATH   


 


Lactobacillus Acidophilus  1 tab  02/03/18 10:00  





  Bacid -  GT   





  BID [facility]   


 


Levetiracetam  500 mg  02/02/18 23:00  02/02/18 23:30





  Keppra Oral Solution -  GT   Not Given





  BID [facility]   


 


Multivitamins  1 each  02/03/18 10:00  





  Total B With C -  PO   





  DAILY [facility]   


 


Nystatin  1 applic  02/03/18 10:00  





  Nystop Powder -  TP   





  DAILY [facility]   


 


Paricalcitol  2 mcg  02/05/18 10:00  





  Zemplar -  IVPUSH   





  MoWeFr@1000 [facility]   








 Home Medications











 Medication  Instructions  Recorded


 


Acetaminophen 650 mg GT Q6H PRN 02/02/18


 


Amlodipine Besylate [Norvasc -] 2.5 mg GT DAILY 02/02/18


 


Apixaban [Eliquis] 5 mg GT BID 02/02/18


 


Collagenase Clostridium Hist. 1 applic TP DAILY 02/02/18





[Santyl]  


 


Insulin (LOG) Aspart [NovoLOG -] 0 units SQ BID 02/02/18


 


Insulin NPH Hum/Reg Insulin Hm 8 unit SQ DAILY 02/02/18





[Novolin 70-30 100 Unit/ml Vial]  


 


Ipratropium 0.02% Nebulizer 1 neb NEB Q4H 02/02/18





[Atrovent]  


 


Lactobacillus Acidophilus 1 each GT BID 02/02/18





[Acidophilus]  


 


Levetiracetam [Keppra Oral 500 mg GT Q12H 02/02/18





Solution -]  


 


Nystatin Powder [Nystop Topical 60 gm TP DAILY 02/02/18





Powder -]  


 


Paricalcitol 2 mcg IV DAILY 02/02/18


 


Polyvinyl Alcohol [Artificial 15 ml OP BID 02/02/18





Tears]  


 


Sevelamer Carbonate 800 mg GT Q8H 02/02/18


 


Vitamin B Complex/Folic Acid 2,000 mcg GT DAILY 02/02/18





[B-Stress Capsules]  











ASSESSMENT/PLAN:


62 year old female with a hx of sacral decubitus, tardive diskinesia, ESRD on HD

, HTN, SVC syndrome/clot, iron-deficiency anemia, Type 2 diabetes, GERD, and 

epilepsy is admitted to the hospital for sepsis 2/2 sacral decubitus





#Sepsis due to sacral ulcer stage IV : continue IV antibiotic zosyn, vancomycin 

per ID recommendations





#ARF over chronic appreciate nephro consult





#Electrolyte imbalance replete as needed.





#Tardive Dyskinesia: stable, was due to antipsychotic use - stopped 6 months ago

, neuro on the case





#Anemia: stable transfuse below 7 s/p one unit.





#Seizure Disorder: continue keppra 500 BID





#Hx of SVC syndrome/clot: on eliquis 5mg BID continue





DVt Px; Eliquis





Visit type





- Emergency Visit


Emergency Visit: Yes


ED Registration Date: 02/02/18


Care time: The patient presented to the Emergency Department on the above date 

and was hospitalized for further evaluation of their emergent condition.





- New Patient


This patient is new to me today: No





- Critical Care


Critical Care patient: No





- Discharge Referral


Referred to [facility] No

## 2018-02-03 NOTE — PN
Teaching Attending Note


Name of Resident: Julio Cesar Lozano





ATTENDING PHYSICIAN STATEMENT





I saw and evaluated the patient.


I reviewed the resident's note and discussed the case with the resident.


I agree with the resident's findings and plan as documented.








SUBJECTIVE:








OBJECTIVE:








ASSESSMENT AND PLAN:


patient is admitted for sepsis 2/2 to sacral decubitus  ulcer patient was noted 

to have tardive dyskinesiea that according to the patients son has been going 

on for a while, she was on antiphychotics but she has not taken the medication 

for over 6 months, since she was admitted to the [facility] for a 

stroke with a SVC clot. 





patient is AAOX3 she is agitated biting her hand involuntary. 





plan;


admit to hospital for sepsis 2/2 sacral ulcer 


start vancomycin and pipercillin/tazobactam 


consult ID 


IVF hydration 


trend lactic acid  


c/w home medication 


f/u with neurology for tradive dyskinesia 


consider psychiatry if the patient wants to be managed for her schizoaffective/

bipolar disorder 


obtain echocardiogram

## 2018-02-03 NOTE — PN
Progress Note, Physician


Chief Complaint: 





ID











Full note dictated





Patient offers no complaints nonverbal





- Current Medication List


Current Medications: 


Active Medications





Acetaminophen (Tylenol -)  650 mg PO Q4H PRN


   PRN Reason: FEVER


Apixaban (Eliquis -)  5 mg PO BID [facility]


Artificial Tears (Artificial Tears)  1 drop OD BID [facility]


Collagenase (Santyl -)  1 applic TP DAILY [facility]


Sodium Chloride (Normal Saline -)  1,000 mls @ 75 mls/hr IV ASDIR [facility]


   Last Admin: 02/02/18 23:30 Dose:  Not Given


Insulin Aspart (Novolog Vial Sliding Scale -)  1 vial SQ TIDAC [facility]


   PRN Reason: Protocol


   Last Admin: 02/03/18 06:23 Dose:  Not Given


Ipratropium Bromide (Atrovent 0.02% Nebulizer -)  1 amp NEB Q4H PRN


   PRN Reason: SHORTNESS OF BREATH


Lactobacillus Acidophilus (Bacid -)  1 tab GT BID [facility]


Levetiracetam (Keppra Oral Solution -)  500 mg GT BID [facility]


   Last Admin: 02/02/18 23:30 Dose:  Not Given


Multivitamins (Total B With C -)  1 each PO DAILY [facility]


Nystatin (Nystop Powder -)  1 applic TP DAILY [facility]


Paricalcitol (Zemplar -)  2 mcg IVPUSH MoWeFr@1000 [facility]











- Objective


Vital Signs: 


 Vital Signs











Temperature  98.4 F   02/03/18 05:49


 


Pulse Rate  99 H  02/03/18 05:49


 


Respiratory Rate  20   02/03/18 05:49


 


Blood Pressure  127/68   02/03/18 05:49


 


O2 Sat by Pulse Oximetry (%)  91 L  02/02/18 23:00











Constitutional: Yes: Obese


Cardiovascular: Yes: S1, S2


Respiratory: Yes: WNL, Regular, CTA Bilaterally


Gastrointestinal: Yes: WNL, Normal Bowel Sounds, Soft.  No: Tenderness, 

Tenderness, Epigastrium


Extremities: Yes: Other (AVF left arm)


Integumentary: Yes: Other (decub Stage 4 sacral)


Labs: 


 CBC, BMP





 02/03/18 08:54 





 02/03/18 06:10 





 INR, PTT











INR  1.42  (0.82-1.09)  H  02/03/18  06:10    














Problem List





- Problems


(1) ESRD (end stage renal disease)


Code(s): N18.6 - END STAGE RENAL DISEASE   





(2) Sepsis


Code(s): A41.9 - SEPSIS, UNSPECIFIED ORGANISM   





(3) Decubital ulcer


Code(s): L89.90 - PRESSURE ULCER OF UNSPECIFIED SITE, UNSPECIFIED STAGE   





Assessment/Plan





Microbiology








 Laboratory Tests











  02/03/18 02/03/18





  06:10 08:54


 


WBC   11.7 H


 


Hgb   6.5 L*


 


Plt Count   568 H


 


BUN  32 H 


 


Creatinine  3.3 H 


 


Creat Clearance w eGFR  14.17 


 


Total Bilirubin  0.6  D 


 


AST  25 


 


ALT  22 


 


Alkaline Phosphatase  149 H 








Assessment Fever ? etiology ESRD Seizures  Stage 4 sacral decub





Plan


Blood cultures x 2


Vanco dose per body weight and Zosyn ( avoid carbepenems seizures)


Neuro consult


Derrick THOMPSON

## 2018-02-03 NOTE — EKG
Test Reason : 

Blood Pressure : ***/*** mmHG

Vent. Rate : 099 BPM     Atrial Rate : 099 BPM

   P-R Int : 148 ms          QRS Dur : 072 ms

    QT Int : 406 ms       P-R-T Axes : 053 036 052 degrees

   QTc Int : 521 ms

 

NORMAL SINUS RHYTHM

POOR R WAVE PROGRESSION

PROLONGED QT

ABNORMAL ECG

NO PREVIOUS ECGS AVAILABLE

NOTE ERROR IN LEAD POSITIONING, LEAD V4

Confirmed by ROMINA THOMPSON, TIANNA ([id number]) on 2/3/2018 1:25:11 PM

 

Referred By:             Confirmed By:TIANNA VANEGAS MD

## 2018-02-04 LAB
ANION GAP SERPL CALC-SCNC: 12 MMOL/L (ref 8–16)
BUN SERPL-MCNC: 42 MG/DL (ref 7–18)
CALCIUM SERPL-MCNC: 9.4 MG/DL (ref 8.5–10.1)
CHLORIDE SERPL-SCNC: 93 MMOL/L (ref 98–107)
CO2 SERPL-SCNC: 28 MMOL/L (ref 21–32)
CREAT SERPL-MCNC: 4.7 MG/DL (ref 0.55–1.02)
DEPRECATED RDW RBC AUTO: 17.7 % (ref 11.6–15.6)
GLUCOSE SERPL-MCNC: 109 MG/DL (ref 74–106)
HCT VFR BLD CALC: 25.5 % (ref 32.4–45.2)
HGB BLD-MCNC: 7.9 GM/DL (ref 10.7–15.3)
MCH RBC QN AUTO: 27.4 PG (ref 25.7–33.7)
MCHC RBC AUTO-ENTMCNC: 31.2 G/DL (ref 32–36)
MCV RBC: 87.8 FL (ref 80–96)
PLATELET # BLD AUTO: 500 K/MM3 (ref 134–434)
PMV BLD: 6.5 FL (ref 7.5–11.1)
POTASSIUM SERPLBLD-SCNC: 3.2 MMOL/L (ref 3.5–5.1)
RBC # BLD AUTO: 2.9 M/MM3 (ref 3.6–5.2)
SODIUM SERPL-SCNC: 133 MMOL/L (ref 136–145)
WBC # BLD AUTO: 10.8 K/MM3 (ref 4–10)

## 2018-02-04 RX ADMIN — INSULIN ASPART SCH: 100 INJECTION, SOLUTION INTRAVENOUS; SUBCUTANEOUS at 06:24

## 2018-02-04 RX ADMIN — APIXABAN SCH MG: 5 TABLET, FILM COATED ORAL at 11:18

## 2018-02-04 RX ADMIN — POLYVINYL ALCOHOL SCH DROP: 14 SOLUTION/ DROPS OPHTHALMIC at 11:17

## 2018-02-04 RX ADMIN — NYSTATIN SCH APPLIC: 100000 POWDER TOPICAL at 11:17

## 2018-02-04 RX ADMIN — LEVETIRACETAM SCH MG: 100 SOLUTION ORAL at 11:18

## 2018-02-04 RX ADMIN — TAZOBACTAM SODIUM AND PIPERACILLIN SODIUM SCH MLS/HR: 250; 2 INJECTION, SOLUTION INTRAVENOUS at 10:19

## 2018-02-04 RX ADMIN — INSULIN ASPART SCH: 100 INJECTION, SOLUTION INTRAVENOUS; SUBCUTANEOUS at 12:12

## 2018-02-04 RX ADMIN — POLYVINYL ALCOHOL SCH DROP: 14 SOLUTION/ DROPS OPHTHALMIC at 23:43

## 2018-02-04 RX ADMIN — COLLAGENASE SANTYL SCH APPLIC: 250 OINTMENT TOPICAL at 11:24

## 2018-02-04 RX ADMIN — ACETAMINOPHEN PRN MG: 325 TABLET ORAL at 17:49

## 2018-02-04 RX ADMIN — APIXABAN SCH MG: 5 TABLET, FILM COATED ORAL at 23:43

## 2018-02-04 RX ADMIN — LEVETIRACETAM SCH MG: 100 SOLUTION ORAL at 23:43

## 2018-02-04 RX ADMIN — INSULIN ASPART SCH: 100 INJECTION, SOLUTION INTRAVENOUS; SUBCUTANEOUS at 17:12

## 2018-02-04 RX ADMIN — Medication SCH TAB: at 11:17

## 2018-02-04 RX ADMIN — VITAMIN C SCH EACH: TAB at 11:17

## 2018-02-04 RX ADMIN — Medication SCH TAB: at 23:43

## 2018-02-04 NOTE — PN
Progress Note, Physician


History of Present Illness: 





Pt seen and examined at bedside. She is now more awake and alert than she was 

yesterday. 





- Current Medication List


Current Medications: 


Active Medications





Acetaminophen (Tylenol -)  650 mg PO Q4H PRN


   PRN Reason: FEVER


   Last Admin: 02/04/18 17:49 Dose:  650 mg


Apixaban (Eliquis -)  5 mg PO BID [facility]


   Last Admin: 02/04/18 11:18 Dose:  5 mg


Artificial Tears (Artificial Tears)  1 drop OD BID [facility]


   Last Admin: 02/04/18 11:17 Dose:  1 drop


Collagenase (Santyl -)  1 applic TP DAILY [facility]


   Last Admin: 02/03/18 10:55 Dose:  1 applic


Piperacillin/Tazobactam/Dextrose (Zosyn 2.25gm Ivpb (Premix))  2.25 gm in 50 

mls @ 100 mls/hr IVPB BID [facility]


   PRN Reason: Protocol


   Last Admin: 02/04/18 10:19 Dose:  100 mls/hr


Insulin Aspart (Novolog Vial Sliding Scale -)  1 vial SQ TIDAC [facility]


   PRN Reason: Protocol


   Last Admin: 02/04/18 17:12 Dose:  Not Given


Ipratropium Bromide (Atrovent 0.02% Nebulizer -)  1 amp NEB Q4H PRN


   PRN Reason: SHORTNESS OF BREATH


Lactobacillus Acidophilus (Bacid -)  1 tab GT BID [facility]


   Last Admin: 02/04/18 11:17 Dose:  1 tab


Levetiracetam (Keppra Oral Solution -)  500 mg GT BID [facility]


   Last Admin: 02/04/18 11:18 Dose:  500 mg


Multivitamins (Total B With C -)  1 each PO DAILY [facility]


   Last Admin: 02/04/18 11:17 Dose:  1 each


Nystatin (Nystop Powder -)  1 applic TP DAILY [facility]


   Last Admin: 02/04/18 11:17 Dose:  1 applic


Paricalcitol (Zemplar -)  2 mcg IVPUSH MoWeFr@1000 [facility]











- Objective


Vital Signs: 


 Vital Signs











Temperature  98.3 F   02/04/18 14:00


 


Pulse Rate  79   02/04/18 14:00


 


Respiratory Rate  18   02/04/18 14:00


 


Blood Pressure  95/42   02/04/18 14:00


 


O2 Sat by Pulse Oximetry (%)  98   02/04/18 09:00











Constitutional: Yes: Calm


Eyes: Yes: Conjunctiva Clear


Cardiovascular: Yes: S1, S2


Respiratory: Yes: CTA Bilaterally


Gastrointestinal: Yes: Soft, Abdomen, Obese


Genitourinary: Yes: Incontinence


Musculoskeletal: Yes: Muscle Weakness


Edema: Yes


Neurological: Yes: Oriented


Labs: 


 CBC, BMP





 02/04/18 08:44 





 02/04/18 08:44 





 INR, PTT











INR  1.42  (0.82-1.09)  H  02/03/18  06:10    














Problem List





- Problems


(1) Decubital ulcer


Code(s): L89.90 - PRESSURE ULCER OF UNSPECIFIED SITE, UNSPECIFIED STAGE   





(2) ESRD (end stage renal disease)


Code(s): N18.6 - END STAGE RENAL DISEASE   





(3) Sepsis


Code(s): A41.9 - SEPSIS, UNSPECIFIED ORGANISM   





Assessment/Plan


 Current Medications











Generic Name Dose Route Start Last Admin





  Trade Name Freq  PRN Reason Stop Dose Admin


 


Acetaminophen  650 mg  02/02/18 19:22  02/04/18 17:49





  Tylenol -  PO   650 mg





  Q4H PRN   Administration





  FEVER   


 


Apixaban  5 mg  02/03/18 10:00  02/04/18 11:18





  Eliquis -  PO   5 mg





  BID KEELY   Administration


 


Artificial Tears  1 drop  02/03/18 10:00  02/04/18 11:17





  Artificial Tears  OD   1 drop





  BID KEELY   Administration


 


Collagenase  1 applic  02/03/18 10:00  02/03/18 10:55





  Santyl -  TP   1 applic





  DAILY KEELY   Administration


 


Piperacillin/Tazobactam/Dextrose  2.25 gm in 50 mls @ 100 mls/hr  02/03/18 10:

00  02/04/18 10:19





  Zosyn 2.25gm Ivpb (Premix)  IVPB   100 mls/hr





  BID KEELY   Administration





  Protocol   


 


Insulin Aspart  1 vial  02/03/18 07:00  02/04/18 17:12





  Novolog Vial Sliding Scale -  SQ   Not Given





  TIDAC [facility]   





  Protocol   


 


Ipratropium Bromide  1 amp  02/02/18 23:00  





  Atrovent 0.02% Nebulizer -  NEB   





  Q4H PRN   





  SHORTNESS OF BREATH   


 


Lactobacillus Acidophilus  1 tab  02/03/18 10:00  02/04/18 11:17





  Bacid -  GT   1 tab





  BID KEELY   Administration


 


Levetiracetam  500 mg  02/02/18 23:00  02/04/18 11:18





  Keppra Oral Solution -  GT   500 mg





  BID KEELY   Administration


 


Multivitamins  1 each  02/03/18 10:00  02/04/18 11:17





  Total B With C -  PO   1 each





  DAILY KE[facility]   Administration


 


Nystatin  1 applic  02/03/18 10:00  02/04/18 11:17





  Nystop Powder -  TP   1 applic





  DAILY KE[facility]   Administration


 


Paricalcitol  2 mcg  02/05/18 10:00  





  Zemplar -  IVPUSH   





  MoWeFr@1000 [facility]   














Impression


1. ESRD


2. anemia


3. change in mental status


4. epilepsy


5. htn


6. dm


7. gerd


8. obesity





Plan


- HD in am


- orders written


- neuro status is improving


- 3 k bath


- can give prbc on HD


- renal dose abx


- will follow


Dr Young

## 2018-02-04 NOTE — PN
<Donta Butterfield - Last Filed: 02/04/18 12:55>


Physical Exam: 


SUBJECTIVE: Patient seen and examined at bedside. No acute overnight events. 

Patient complains of 5/10 pain in her R arm and on her sacrum. Denies fevers, 

chills, nausea, vomiting. As per patient gets dialysis through left arm.








OBJECTIVE:





 Vital Signs











 Period  Temp  Pulse  Resp  BP Sys/Brown  Pulse Ox


 


 Last 24 Hr  98 F-99.9 F  74-95  18-20  /46-73  98











GENERAL: The patient is awake, alert, and fully oriented, in no acute distress.


HEAD: Normal with no signs of trauma.


LUNGS: Breath sounds equal, clear to auscultation bilaterally, no wheezes, no 

crackles, no accessory muscle use. 


HEART: 3/6 holosystolic murmur, S1, S2 without murmur, rub or gallop.


ABDOMEN: Obese, Soft, nontender, nondistended, normoactive bowel sounds, no 

guarding, no rebound, no hepatosplenomegaly, no masses.


EXTREMITIES: 2+ pulses, warm, well-perfused, no edema. Surgical scars noted on 

R arm representative of previous graft/fistula. large stage 4 Sacral decubitus, 

non-draining.


NEUROLOGICAL: Cranial nerves II through XII grossly intact. Lip smacking 

observed


PSYCH: Normal mood, normal affect.


SKIN: Warm, dry, normal turgor, no rashes or lesions noted














 Laboratory Results - last 24 hr











  02/03/18 02/03/18 02/03/18





  06:10 08:54 11:38


 


WBC   


 


RBC   


 


Hgb   


 


Hct   


 


MCV   


 


MCH   


 


MCHC   


 


RDW   


 


Plt Count   


 


MPV   


 


Hypochromia  2+  


 


Platelet Estimate  Increased  


 


Platelet Comment  No clotting detected  


 


Polychromasia  1+  


 


Poikilocytosis  1+  


 


Basophilic Stippling  1+  


 


Anisocytosis  1+  


 


Stomatocytes  1+  


 


Sodium   


 


Potassium   


 


Chloride   


 


Carbon Dioxide   


 


Anion Gap   


 


BUN   


 


Creatinine   


 


POC Glucometer    128


 


Random Glucose   


 


Calcium   


 


Random Vancomycin   


 


Blood Type   O POSITIVE 


 


Antibody Screen   Negative 


 


Crossmatch   See Detail 














  02/03/18 02/03/18 02/04/18





  16:20 21:45 00:00


 


WBC   10.1 H 


 


RBC   2.73 L 


 


Hgb   7.6 L D 


 


Hct   24.1 L D 


 


MCV   88.4 


 


MCH   27.6 


 


MCHC   31.3 L 


 


RDW   18.1 H 


 


Plt Count   505 H 


 


MPV   7.0 L 


 


Hypochromia   


 


Platelet Estimate   


 


Platelet Comment   


 


Polychromasia   


 


Poikilocytosis   


 


Basophilic Stippling   


 


Anisocytosis   


 


Stomatocytes   


 


Sodium   


 


Potassium   


 


Chloride   


 


Carbon Dioxide   


 


Anion Gap   


 


BUN   


 


Creatinine   


 


POC Glucometer  167   105


 


Random Glucose   


 


Calcium   


 


Random Vancomycin   


 


Blood Type   


 


Antibody Screen   


 


Crossmatch   














  02/04/18 02/04/18 02/04/18





  06:07 08:44 08:44


 


WBC   10.8 H 


 


RBC   2.90 L 


 


Hgb   7.9 L 


 


Hct   25.5 L 


 


MCV   87.8 


 


MCH   27.4 


 


MCHC   31.2 L 


 


RDW   17.7 H 


 


Plt Count   500 H 


 


MPV   6.5 L 


 


Hypochromia   


 


Platelet Estimate   


 


Platelet Comment   


 


Polychromasia   


 


Poikilocytosis   


 


Basophilic Stippling   


 


Anisocytosis   


 


Stomatocytes   


 


Sodium    133 L


 


Potassium    3.2 L


 


Chloride    93 L


 


Carbon Dioxide    28


 


Anion Gap    12


 


BUN    42 H


 


Creatinine    4.7 H


 


POC Glucometer  110  


 


Random Glucose    109 H


 


Calcium    9.4


 


Random Vancomycin   


 


Blood Type   


 


Antibody Screen   


 


Crossmatch   














  02/04/18





  09:45


 


WBC 


 


RBC 


 


Hgb 


 


Hct 


 


MCV 


 


MCH 


 


MCHC 


 


RDW 


 


Plt Count 


 


MPV 


 


Hypochromia 


 


Platelet Estimate 


 


Platelet Comment 


 


Polychromasia 


 


Poikilocytosis 


 


Basophilic Stippling 


 


Anisocytosis 


 


Stomatocytes 


 


Sodium 


 


Potassium 


 


Chloride 


 


Carbon Dioxide 


 


Anion Gap 


 


BUN 


 


Creatinine 


 


POC Glucometer 


 


Random Glucose 


 


Calcium 


 


Random Vancomycin  18.177


 


Blood Type 


 


Antibody Screen 


 


Crossmatch 








Active Medications











Generic Name Dose Route Start Last Admin





  Trade Name Freq  PRN Reason Stop Dose Admin


 


Acetaminophen  650 mg  02/02/18 19:22  





  Tylenol -  PO   





  Q4H PRN   





  FEVER   


 


Acetaminophen  1,000 mg  02/04/18 11:49  





  Tylenol Oral Solution -  PEG  02/04/18 11:50  





  ONCE ONE   


 


Apixaban  5 mg  02/03/18 10:00  02/04/18 11:18





  Eliquis -  PO   5 mg





  BID KEELY   Administration


 


Artificial Tears  1 drop  02/03/18 10:00  02/04/18 11:17





  Artificial Tears  OD   1 drop





  BID KEELY   Administration


 


Collagenase  1 applic  02/03/18 10:00  02/03/18 10:55





  Santyl -  TP   1 applic





  DAILY KEELY   Administration


 


Piperacillin/Tazobactam/Dextrose  2.25 gm in 50 mls @ 100 mls/hr  02/03/18 10:

00  02/04/18 10:19





  Zosyn 2.25gm Ivpb (Premix)  IVPB   100 mls/hr





  BID KEELY   Administration





  Protocol   


 


Insulin Aspart  1 vial  02/03/18 07:00  02/04/18 06:24





  Novolog Vial Sliding Scale -  SQ   Not Given





  [facility]   





  Protocol   


 


Ipratropium Bromide  1 amp  02/02/18 23:00  





  Atrovent 0.02% Nebulizer -  NEB   





  Q4H PRN   





  SHORTNESS OF BREATH   


 


Lactobacillus Acidophilus  1 tab  02/03/18 10:00  02/04/18 11:17





  Bacid -  GT   1 tab





  BID KEELY   Administration


 


Levetiracetam  500 mg  02/02/18 23:00  02/04/18 11:18





  Keppra Oral Solution -  GT   500 mg





  BID KEELY   Administration


 


Multivitamins  1 each  02/03/18 10:00  02/04/18 11:17





  Total B With C -  PO   1 each





  DAILY KEELY   Administration


 


Nystatin  1 applic  02/03/18 10:00  02/04/18 11:17





  Nystop Powder -  TP   1 applic





  DAILY KEELY   Administration


 


Paricalcitol  2 mcg  02/05/18 10:00  





  Zemplar -  IVPUSH   





  [email or web address]   











ASSESSMENT/PLAN:





62 year old female with a hx of sacral decubitus, tardive diskinesia, ESRD on HD

, HTN, SVC syndrome/clot, iron-deficiency anemia, Type 2 diabetes, GERD, and 

epilepsy is admitted to the hospital for sepsis 2/2 sacral decubitus





#Sepsis 2/2 sacral ulcer: 


-continue zosyn, vancomycin per ID recommendations


-appreciate ID consultation


-blood cultures negative


-wound cultures grew multiple organisms, await sensitivities


-lactic acid normalized


-echocardiogram


-consult Dr. Heredia appreciated


-tylenol 650 PRN pain





#Tardive Dyskinesia: stable, was due to antipsychotic use - stopped 6 months ago


-appreciate neuro consultation





#ESRD: creatinine increasing


-HD planned for monday


-appreciate nephrology recommendations





#Anemia: hgb 7.9, stable


-transfuse as necessary if hgb < 7





#Seizure Disorder: stable


-continue keppra 500 BID





#SVC syndrome/clot: stable


-continue eliquis 5mg BID





#FEN


-tube feed nepro


-careful with repletion


-no standing fluids





#Prophylaxis


-On eliquis





#Disposition:


-Continue to monitor on med surg





Visit type





- Emergency Visit


Emergency Visit: No





- New Patient


This patient is new to me today: Yes


Date on this admission: 02/04/18





- Critical Care


Critical Care patient: No





<Lisset Lowe - Last Filed: 02/04/18 19:22>


Physical Exam: 


 Patient seen and examined agree with the plan.





 CBCD











WBC  10.8 K/mm3 (4.0-10.0)  H  02/04/18  08:44    


 


RBC  2.90 M/mm3 (3.60-5.2)  L  02/04/18  08:44    


 


Hgb  7.9 GM/dL (10.7-15.3)  L  02/04/18  08:44    


 


Hct  25.5 % (32.4-45.2)  L  02/04/18  08:44    


 


MCV  87.8 fl (80-96)   02/04/18  08:44    


 


MCHC  31.2 g/dl (32.0-36.0)  L  02/04/18  08:44    


 


RDW  17.7 % (11.6-15.6)  H  02/04/18  08:44    


 


Plt Count  500 K/MM3 (134-434)  H  02/04/18  08:44    


 


MPV  6.5 fl (7.5-11.1)  L  02/04/18  08:44    








 CMP











Sodium  133 mmol/L (136-145)  L  02/04/18  08:44    


 


Potassium  3.2 mmol/L (3.5-5.1)  L  02/04/18  08:44    


 


Chloride  93 mmol/L ()  L  02/04/18  08:44    


 


Carbon Dioxide  28 mmol/L (21-32)   02/04/18  08:44    


 


Anion Gap  12  (8-16)   02/04/18  08:44    


 


BUN  42 mg/dL (7-18)  H  02/04/18  08:44    


 


Creatinine  4.7 mg/dL (0.55-1.02)  H  02/04/18  08:44    


 


Creat Clearance w eGFR  14.17  (>60)   02/03/18  06:10    


 


Random Glucose  109 mg/dL ()  H  02/04/18  08:44    


 


Calcium  9.4 mg/dL (8.5-10.1)   02/04/18  08:44    


 


Total Bilirubin  0.6 mg/dL (0.2-1.0)  D 02/03/18  06:10    


 


AST  25 U/L (15-37)   02/03/18  06:10    


 


ALT  22 U/L (12-78)   02/03/18  06:10    


 


Alkaline Phosphatase  149 U/L ()  H  02/03/18  06:10    


 


Total Protein  7.1 g/dl (6.4-8.2)   02/03/18  06:10    


 


Albumin  2.6 g/dl (3.4-5.0)  L  02/03/18  06:10    








 CARDIAC ENZYMES











Creatine Kinase  448 IU/L ()  H  02/02/18  14:39    


 


Troponin I  < 0.02 ng/ml (0.00-0.05)   02/02/18  14:39    








 Current Medications











Generic Name Dose Route Start Last Admin





  Trade Name Freq  PRN Reason Stop Dose Admin


 


Acetaminophen  650 mg  02/02/18 19:22  02/04/18 17:49





  Tylenol -  PO   650 mg





  Q4H PRN   Administration





  FEVER   


 


Apixaban  5 mg  02/03/18 10:00  02/04/18 11:18





  Eliquis -  PO   5 mg





  BID KEELY   Administration


 


Artificial Tears  1 drop  02/03/18 10:00  02/04/18 11:17





  Artificial Tears  OD   1 drop





  BID KEELY   Administration


 


Collagenase  1 applic  02/03/18 10:00  02/03/18 10:55





  Santyl -  TP   1 applic





  DAILY KEELY   Administration


 


Piperacillin/Tazobactam/Dextrose  2.25 gm in 50 mls @ 100 mls/hr  02/03/18 10:

00  02/04/18 10:19





  Zosyn 2.25gm Ivpb (Premix)  IVPB   100 mls/hr





  BID KEELY   Administration





  Protocol   


 


Insulin Aspart  1 vial  02/03/18 07:00  02/04/18 17:12





  Novolog Vial Sliding Scale -  SQ   Not Given





  [facility]   





  Protocol   


 


Ipratropium Bromide  1 amp  02/02/18 23:00  





  Atrovent 0.02% Nebulizer -  NEB   





  Q4H PRN   





  SHORTNESS OF BREATH   


 


Lactobacillus Acidophilus  1 tab  02/03/18 10:00  02/04/18 11:17





  Bacid -  GT   1 tab





  BID KEELY   Administration


 


Levetiracetam  500 mg  02/02/18 23:00  02/04/18 11:18





  Keppra Oral Solution -  GT   500 mg





  BID KEELY   Administration


 


Multivitamins  1 each  02/03/18 10:00  02/04/18 11:17





  Total B With C -  PO   1 each





  DAILY KEELY   Administration


 


Nystatin  1 applic  02/03/18 10:00  02/04/18 11:17





  Nystop Powder -  TP   1 applic





  DAILY [facility]   Administration


 


Paricalcitol  2 mcg  02/05/18 10:00  





  Zemplar -  IVPUSH   





  [email or web address]   








 Home Medications











 Medication  Instructions  Recorded


 


Acetaminophen 650 mg GT Q6H PRN 02/02/18


 


Amlodipine Besylate [Norvasc -] 2.5 mg GT DAILY 02/02/18


 


Apixaban [Eliquis] 5 mg GT BID 02/02/18


 


Collagenase Clostridium Hist. 1 applic TP DAILY 02/02/18





[Santyl]  


 


Insulin (LOG) Aspart [NovoLOG -] 0 units SQ BID 02/02/18


 


Insulin NPH Hum/Reg Insulin Hm 8 unit SQ DAILY 02/02/18





[Novolin 70-30 100 Unit/ml Vial]  


 


Ipratropium 0.02% Nebulizer 1 neb NEB Q4H 02/02/18





[Atrovent]  


 


Lactobacillus Acidophilus 1 each GT BID 02/02/18





[Acidophilus]  


 


Levetiracetam [Keppra Oral 500 mg GT Q12H 02/02/18





Solution -]  


 


Nystatin Powder [Nystop Topical 60 gm TP DAILY 02/02/18





Powder -]  


 


Paricalcitol 2 mcg IV DAILY 02/02/18


 


Polyvinyl Alcohol [Artificial 15 ml OP BID 02/02/18





Tears]  


 


Sevelamer Carbonate 800 mg GT Q8H 02/02/18


 


Vitamin B Complex/Folic Acid 2,000 mcg GT DAILY 02/02/18





[B-Stress Capsules]

## 2018-02-04 NOTE — CONSULT
Consult - text type





- Consultation


Consultation Note: 








NEUROLOGY CONSULTATION is greatly appreciated:





This 63 yo woman is a [facility] resident with h/o Chronic schizophrenia, HTN, 

DM, ASHD, AFIB and ESRD on HD.


Maintained on amlodipine, eliquis, atrovent, insulins. Apparently, her psyche 

meds were stopped 6 mos ago.


S/P left CVA with right hemiparesis and seizure disorder for which she receives 

levetiracetam elixor, 500 mg BID.


Chronic infections include a stage IV sacral decubitus ulcer and C. difficile 

enterocolitis for which she receives Vancomycin and Zosyn.





On 2/2/18 patient had HD and then wound care where she had decreased 

responsiveness and increased facial twitching suggesting a seizure.


Found to have anemia, now s/p transfusion. Continues on Zosyn and vancomycin


According to RN and her health aide, patient is much more alert, responsive and 

communicative today.





CT of head (reviewed); Moderately severe, diffuse, cerebral atrophy and diffuse 

microvascular changes.





[place]: Neck supple, obese, No bruits.


NEURO: Awake, alert. [address], month or year.


           Sparse, perseverative speech. Follows simple commands. 


          +Glabella, snout. Left grasp.


           Frequent, stereotyped facial-larry-buccal dyskinetic movt's. Reflex 

chewing on her nasal O2 canula.


           Full fields to threat. Full EOM's. Gag present.


           Moves L>>R arms. Decreased movements both legs. Right leg rests 

everted. Areflexic in legs. Plantars silent.


           Decreased response to pinch both feet. Promptly withdraws both hands.





IMP: 1. Moderately severe, B/L, cerebral dysfunction (OMS, Chronic).


       2. Left cerebral accentualtion s/p lacunar CVA.


       3. Facial mov'ts represent Tardive Dyskinesia (not seizures) and will 

increase after discontinuation of Neuroleptics.


       4. Mentation and facial movements will all increase with Tocxic-

Metabolic encephalopathy (due to infection).


       5. Seizure disorder by history.


       6. Severe diabetic peripheral neuropathy.





SUGGEST: Continue antibiotics, wound care, and current regimen.


              Continue levetiracetam 500 mg elixor q 12 hrs via GT.


              Check B12, TSH, RPR.





Thank you very much,


Roscoe Muller MD

## 2018-02-05 LAB
ALBUMIN SERPL-MCNC: 2.3 G/DL (ref 3.4–5)
ALP SERPL-CCNC: 167 U/L (ref 45–117)
ALT SERPL-CCNC: 23 U/L (ref 12–78)
ANION GAP SERPL CALC-SCNC: 14 MMOL/L (ref 8–16)
AST SERPL-CCNC: 25 U/L (ref 15–37)
BILIRUB SERPL-MCNC: 0.5 MG/DL (ref 0.2–1)
BUN SERPL-MCNC: 16 MG/DL (ref 7–18)
BUN SERPL-MCNC: 51 MG/DL (ref 7–18)
CALCIUM SERPL-MCNC: 9.2 MG/DL (ref 8.5–10.1)
CHLORIDE SERPL-SCNC: 96 MMOL/L (ref 98–107)
CO2 SERPL-SCNC: 26 MMOL/L (ref 21–32)
CREAT SERPL-MCNC: 2 MG/DL (ref 0.55–1.02)
CREAT SERPL-MCNC: 5.7 MG/DL (ref 0.55–1.02)
DEPRECATED RDW RBC AUTO: 18.2 % (ref 11.6–15.6)
GLUCOSE SERPL-MCNC: 98 MG/DL (ref 74–106)
HCT VFR BLD CALC: 24.5 % (ref 32.4–45.2)
HGB BLD-MCNC: 7.6 GM/DL (ref 10.7–15.3)
MAGNESIUM SERPL-MCNC: 2.6 MG/DL (ref 1.8–2.4)
MCH RBC QN AUTO: 27.4 PG (ref 25.7–33.7)
MCHC RBC AUTO-ENTMCNC: 31.2 G/DL (ref 32–36)
MCV RBC: 87.9 FL (ref 80–96)
PHOSPHATE SERPL-MCNC: 4.3 MG/DL (ref 2.5–4.9)
PLATELET # BLD AUTO: 552 K/MM3 (ref 134–434)
PMV BLD: 7 FL (ref 7.5–11.1)
POTASSIUM SERPLBLD-SCNC: 3.6 MMOL/L (ref 3.5–5.1)
PROT SERPL-MCNC: 6.9 G/DL (ref 6.4–8.2)
RBC # BLD AUTO: 2.78 M/MM3 (ref 3.6–5.2)
SODIUM SERPL-SCNC: 136 MMOL/L (ref 136–145)
WBC # BLD AUTO: 11.8 K/MM3 (ref 4–10)

## 2018-02-05 RX ADMIN — ACETAMINOPHEN PRN MG: 325 TABLET ORAL at 23:25

## 2018-02-05 RX ADMIN — Medication SCH TAB: at 12:03

## 2018-02-05 RX ADMIN — NYSTATIN SCH: 100000 POWDER TOPICAL at 12:05

## 2018-02-05 RX ADMIN — INSULIN ASPART SCH: 100 INJECTION, SOLUTION INTRAVENOUS; SUBCUTANEOUS at 17:11

## 2018-02-05 RX ADMIN — TAZOBACTAM SODIUM AND PIPERACILLIN SODIUM SCH: 250; 2 INJECTION, SOLUTION INTRAVENOUS at 18:26

## 2018-02-05 RX ADMIN — APIXABAN SCH MG: 5 TABLET, FILM COATED ORAL at 12:03

## 2018-02-05 RX ADMIN — NYSTATIN SCH APPLIC: 100000 POWDER TOPICAL at 12:08

## 2018-02-05 RX ADMIN — INSULIN ASPART SCH: 100 INJECTION, SOLUTION INTRAVENOUS; SUBCUTANEOUS at 12:25

## 2018-02-05 RX ADMIN — VITAMIN C SCH EACH: TAB at 12:03

## 2018-02-05 RX ADMIN — APIXABAN SCH MG: 5 TABLET, FILM COATED ORAL at 23:24

## 2018-02-05 RX ADMIN — INSULIN ASPART SCH: 100 INJECTION, SOLUTION INTRAVENOUS; SUBCUTANEOUS at 06:00

## 2018-02-05 RX ADMIN — PARICALCITOL SCH MCG: 5 INJECTION, SOLUTION INTRAVENOUS at 07:58

## 2018-02-05 RX ADMIN — LEVETIRACETAM SCH MG: 100 SOLUTION ORAL at 12:03

## 2018-02-05 RX ADMIN — COLLAGENASE SANTYL SCH: 250 OINTMENT TOPICAL at 12:02

## 2018-02-05 RX ADMIN — PARICALCITOL SCH: 5 INJECTION, SOLUTION INTRAVENOUS at 12:04

## 2018-02-05 RX ADMIN — POLYVINYL ALCOHOL SCH: 14 SOLUTION/ DROPS OPHTHALMIC at 12:05

## 2018-02-05 RX ADMIN — LEVETIRACETAM SCH MG: 100 SOLUTION ORAL at 23:24

## 2018-02-05 RX ADMIN — TAZOBACTAM SODIUM AND PIPERACILLIN SODIUM SCH MLS/HR: 250; 2 INJECTION, SOLUTION INTRAVENOUS at 23:26

## 2018-02-05 RX ADMIN — Medication SCH TAB: at 23:25

## 2018-02-05 NOTE — CONSULT
Admitting History and Physical





- Primary Care Physician


PCP: Lisset Lowe





- Admission


History of Present Illness: 


Per emr:


62 year old female with a hx of sacral decubitus, tardive diskinesia, ESRD on HD

, HTN, SVC syndrome/clot, iron-deficiency anemia, Type 2 diabetes, GERD, and 

epilepsy is admitted to the hospital for sepsis 2/2 sacral decubitus





Per neurology:


NEURO: Awake, alert. Ox [address] Not hospital, month or year.


           Sparse, perseverative speech. Follows simple commands. 


          +Glabella, snout. Left grasp.


           Frequent, stereotyped facial-larry-buccal dyskinetic movt's. Reflex 

chewing on her nasal O2 canula.


           Full fields to threat. Full EOM's. Gag present.


           Moves L>>R arms. Decreased movements both legs. Right leg rests 

everted. Areflexic in legs. Plantars silent.


           Decreased response to pinch both feet. Promptly withdraws both hands.





IMP: 1. Moderately severe, B/L, cerebral dysfunction (OMS, Chronic).


       2. Left cerebral accentualtion s/p lacunar CVA.


       3. Facial mov'ts represent Tardive Dyskinesia (not seizures) and will 

increase after discontinuation of Neuroleptics.


       4. Mentation and facial movements will all increase with Toxic-Metabolic 

encephalopathy (due to infection).


       5. Seizure disorder by history.


       6. Severe diabetic peripheral neuropathy.


 Selected Entries











  02/02/18 02/02/18 02/03/18





  13:43 18:27 01:40


 


Breakfast   


 


Temperature 100.0 F H 98.2 F 98 F














  02/03/18 02/03/18 02/03/18





  05:49 09:00 13:39


 


Breakfast   


 


Temperature 98.4 F 100.3 F H 99.9 F H














  02/03/18 02/03/18 02/03/18





  14:34 17:15 17:30


 


Breakfast   


 


Temperature 99.6 F 99.2 F 99.2 F














  02/03/18 02/03/18 02/04/18





  18:00 21:00 01:04


 


Breakfast   


 


Temperature 99.2 F 99.9 F H 98.5 F














  02/04/18 02/04/18 02/04/18





  04:44 14:00 18:00


 


Breakfast   


 


Temperature 98 F 98.3 F 98.1 F














  02/04/18 02/05/18 02/05/18





  22:37 01:56 06:20


 


Breakfast   


 


Temperature 98.3 F 98.8 F 98.6 F














  02/05/18 02/05/18





  07:15 10:27


 


Breakfast  NPO


 


Temperature 98.0 F 








 Laboratory Tests











  02/02/18 02/03/18 02/04/18





  13:25 06:10 08:44


 


WBC  Cancelled  10.4 H  10.8 H














  02/05/18





  07:00


 


WBC  11.8 H








At [facility] NPO/GT feedings. Orders at NH for NPO/GT feeding.


History Source: Medical Record


Limitations to Obtaining History: Clinical Condition





- Past Medical History


CNS: Yes: CVA, Seizure


Cardiovascular: Yes: HTN


Renal/: Yes: Hemodialysis


Heme/Onc: Yes: Anemia


Endocrine: Yes: Diabetes Mellitus





- Past Surgical History


Past Surgical History: Yes: AV Fistula/Graft





- Smoking History


Smoking history: Unknown if ever smoked





History





- Admission


Reason For Visit: SEPSIS





- Diagnostics


CT Scan: Report Reviewed (Moderately severe, diffuse, cerebral atrophy and 

diffuse microvascular changes.)





- General


Mental Status: Awake and Alert, Able to Follow Commands, Forgetful, Vague


Attention: Intact


Ability to Follow Directions: Fair





- Hearing


Hearing: Normal





Speech Evaluation





- Communication


Primary Language: ENGLISH


Communication: Yes: Simple Responses





- Speech Production


Able to Make Needs Known: Yes: Mildly Impaired, Moderately Impaired


Intelligibility: Yes: Mildly Impaired, Moderately Impaired





- Speech Characteristics


Voice Loudness: Moderately Soft/Quiet


Voice Phonatory-based Quality: Yes: Dysphonia


Speech Pattern: Impaired


Speech Clarity: < 50%


Nasal Resonance: Normal


Articulation: Yes: Precise


Voice, Other Observations: Yes: Progressively Weak Voice, Inadequate Breath 

Support





- Language/Auditory Comprehension


Follows: Yes: 1 Stage Simple Commands





- Language/Verbal Expression


Functional Communication Status: Yes: Mildly Impaired, Moderately Impaired





- Swallow Evaluation/Bedside Assessment


Current Nutritional Intake: NPO, G Tube


Facial Symmetry at Rest: Symmetrical


Pucker Lips: Normal


Smile: Normal


Lingual Movement: Symmetric


Lingual Speed of Movement: Normal


Lingual Movement Strgth Against Opposition: Reduced


Lingual Movement Characteristics: Apraxic


Laryngeal Movement: Able to Palpate


Bolus Size: WFL


Labial Seal: WFL


Oral Prep Time: Increased


A-P Transit: Impaired


Timing of Swallow: Delayed


Coughing/Throat Clear: No


Change in Voice: No





Recommendations





- Speech Evaluation, Impression/Plan


Impression: Oriented to hospital, age,lives in NH.Dysphonia r/o vocal cord 

dysfunction vs impaired respiratory capacity for speech purposes. Swallow seems 

brisk with fair tolerance of puree and water without overt signs of aspiration. 

Tardive dyskinesia? with labial pumping. suspect aerophagia.





- Disposition


Discharge to: Skilled Nursing Facility





- Dysphagia Impressions/Plan


Dysphagia Impressions: Ongoing Evaluation


*Silent aspiration: cannot be R/O at bedside


Recommendations: MBS w Esophagus, Other (Continue NPO/GT until MBS performed.)

## 2018-02-05 NOTE — PN
<Donta Butterfield - Last Filed: 02/05/18 12:58>


Physical Exam: 


SUBJECTIVE: Patient seen and examined in dialysis. She does not complain of any 

chest pain, n/v/d, SOB, fevers, chills. States that she has pain in her sacral 

area.








OBJECTIVE:





 Vital Signs











 Period  Temp  Pulse  Resp  BP Sys/Brown  Pulse Ox


 


 Last 24 Hr  98.0 F-98.8 F  60-93  18-24  /42-74  96











GENERAL: The patient is awake, alert, and fully oriented, in no acute distress.


HEAD: Normal with no signs of trauma.


LUNGS: Breath sounds equal, clear to auscultation bilaterally, no wheezes, no 

crackles, no accessory muscle use. 


HEART: 3/6 holosystolic murmur, S1, S2 without murmur, rub or gallop.


ABDOMEN: Obese, Soft, nontender, nondistended, normoactive bowel sounds, no 

guarding, no rebound, no hepatosplenomegaly, no masses.


EXTREMITIES: 2+ pulses, warm, well-perfused, no edema. Surgical scars noted on 

R arm representative of previous graft/fistula. large stage 4 Sacral decubitus, 

non-draining.


NEUROLOGICAL: Cranial nerves II through XII grossly intact. Lip smacking 

observed


PSYCH: Normal mood, normal affect.


SKIN: Warm, dry, normal turgor, no rashes or lesions noted














 Laboratory Results - last 24 hr











  02/03/18 02/04/18 02/05/18





  11:10 16:56 06:00


 


WBC   


 


RBC   


 


Hgb   


 


Hct   


 


MCV   


 


MCH   


 


MCHC   


 


RDW   


 


Plt Count   


 


MPV   


 


Sodium   


 


Potassium   


 


Chloride   


 


Carbon Dioxide   


 


Anion Gap   


 


BUN   


 


Creatinine   


 


Creat Clearance w eGFR   


 


POC Glucometer   118  112


 


Random Glucose   


 


Calcium   


 


Phosphorus   


 


Magnesium   


 


Total Bilirubin   


 


AST   


 


ALT   


 


Alkaline Phosphatase   


 


Total Protein   


 


Albumin   


 


Blood Type  O POSITIVE  


 


Crossmatch  See Detail  














  02/05/18 02/05/18 02/05/18





  06:30 07:00 07:00


 


WBC    11.8 H


 


RBC    2.78 L


 


Hgb    7.6 L


 


Hct    24.5 L


 


MCV    87.9


 


MCH    27.4


 


MCHC    31.2 L


 


RDW    18.2 H


 


Plt Count    552 H


 


MPV    7.0 L


 


Sodium   136 


 


Potassium   3.6 


 


Chloride   96 L 


 


Carbon Dioxide   26 


 


Anion Gap   14 


 


BUN   51 H 


 


Creatinine   5.7 H 


 


Creat Clearance w eGFR   7.54 


 


POC Glucometer   


 


Random Glucose   98 


 


Calcium   9.2 


 


Phosphorus  4.3  


 


Magnesium  2.6 H  


 


Total Bilirubin   0.5 


 


AST   25 


 


ALT   23 


 


Alkaline Phosphatase   167 H 


 


Total Protein   6.9 


 


Albumin   2.3 L 


 


Blood Type   


 


Crossmatch   














  02/05/18 02/05/18





  10:45 12:17


 


WBC  


 


RBC  


 


Hgb  


 


Hct  


 


MCV  


 


MCH  


 


MCHC  


 


RDW  


 


Plt Count  


 


MPV  


 


Sodium  


 


Potassium  


 


Chloride  


 


Carbon Dioxide  


 


Anion Gap  


 


BUN  16  D 


 


Creatinine  2.0 H 


 


Creat Clearance w eGFR  


 


POC Glucometer   106


 


Random Glucose  


 


Calcium  


 


Phosphorus  


 


Magnesium  


 


Total Bilirubin  


 


AST  


 


ALT  


 


Alkaline Phosphatase  


 


Total Protein  


 


Albumin  


 


Blood Type  


 


Crossmatch  








Active Medications











Generic Name Dose Route Start Last Admin





  Trade Name Freq  PRN Reason Stop Dose Admin


 


Acetaminophen  650 mg  02/02/18 19:22  02/04/18 17:49





  Tylenol -  PO   650 mg





  Q4H PRN   Administration





  FEVER   


 


Apixaban  5 mg  02/03/18 10:00  02/05/18 12:03





  Eliquis -  PO   5 mg





  BID KEELY   Administration


 


Artificial Tears  1 drop  02/03/18 10:00  02/05/18 12:05





  Artificial Tears  OD   Not Given





  BID KEELY   


 


Collagenase  1 applic  02/03/18 10:00  02/05/18 12:02





  Santyl -  TP   Not Given





  DAILY KEELY   


 


Piperacillin/Tazobactam/Dextrose  2.25 gm in 50 mls @ 100 mls/hr  02/03/18 10:

00  02/04/18 10:19





  Zosyn 2.25gm Ivpb (Premix)  IVPB   100 mls/hr





  BID KEELY   Administration





  Protocol   


 


Insulin Aspart  1 vial  02/03/18 07:00  02/05/18 12:25





  Novolog Vial Sliding Scale -  SQ   Not Given





  TIDAC [facility]   





  Protocol   


 


Ipratropium Bromide  1 amp  02/02/18 23:00  





  Atrovent 0.02% Nebulizer -  NEB   





  Q4H PRN   





  SHORTNESS OF BREATH   


 


Lactobacillus Acidophilus  1 tab  02/03/18 10:00  02/05/18 12:03





  Bacid -  GT   1 tab





  BID KEELY   Administration


 


Levetiracetam  500 mg  02/02/18 23:00  02/05/18 12:03





  Keppra Oral Solution -  GT   500 mg





  BID KEELY   Administration


 


Multivitamins  1 each  02/03/18 10:00  02/05/18 12:03





  Total B With C -  PO   1 each





  DAILY KEELY   Administration


 


Nystatin  1 applic  02/03/18 10:00  02/05/18 12:08





  Nystop Powder -  TP   1 applic





  DAILY KEELY   Administration


 


Paricalcitol  2 mcg  02/05/18 10:00  02/05/18 12:04





  Zemplar -  IVPUSH   Not Given





  [email or web address] [facility]   











ASSESSMENT/PLAN:





62 year old female with a hx of sacral decubitus, tardive diskinesia, ESRD on HD

, HTN, SVC syndrome/clot, iron-deficiency anemia, Type 2 diabetes, GERD, and 

epilepsy is admitted to the hospital for sepsis 2/2 sacral decubitus





#Sepsis 2/2 sacral ulcer: 


-continue zosyn


-appreciate ID consultation


-blood cultures negative


-wound cultures grew multiple organisms, await sensitivities


-lactic acid normalized


-echocardiogram


-consult Dr. Heredia appreciated - will put on wound vac and continue treatment 

at a SNF.


-tylenol 650 PRN pain





#Tardive Dyskinesia: stable, was due to antipsychotic use - stopped 6 months ago


-appreciate neuro consultation





#ESRD: creatinine increasing


-HD planned for monday


-appreciate nephrology recommendations





#Anemia: hgb 7.9, stable


-transfuse as necessary if hgb < 7





#Seizure Disorder: stable


-continue keppra 500 BID





#SVC syndrome/clot: stable


-continue eliquis 5mg BID





#FEN


-tube feed nepro


-careful with repletion


-no standing fluids





#Prophylaxis


-On eliquis





#Disposition:


-Continue to monitor on med surg





Visit type





- Emergency Visit


Emergency Visit: No





- New Patient


This patient is new to me today: No





- Critical Care


Critical Care patient: No





<Lisset Lowe - Last Filed: 02/05/18 19:21>


Physical Exam: 


 Patient seen and examined , agree with the plan.


 Vital Signs











Temperature  98.9 F   02/05/18 18:38


 


Pulse Rate  86   02/05/18 18:38


 


Respiratory Rate  20   02/05/18 18:38


 


Blood Pressure  102/49   02/05/18 18:38


 


O2 Sat by Pulse Oximetry (%)  96   02/05/18 09:00








 CBCD











WBC  11.8 K/mm3 (4.0-10.0)  H  02/05/18  07:00    


 


RBC  2.78 M/mm3 (3.60-5.2)  L  02/05/18  07:00    


 


Hgb  7.6 GM/dL (10.7-15.3)  L  02/05/18  07:00    


 


Hct  24.5 % (32.4-45.2)  L  02/05/18  07:00    


 


MCV  87.9 fl (80-96)   02/05/18  07:00    


 


MCHC  31.2 g/dl (32.0-36.0)  L  02/05/18  07:00    


 


RDW  18.2 % (11.6-15.6)  H  02/05/18  07:00    


 


Plt Count  552 K/MM3 (134-434)  H  02/05/18  07:00    


 


MPV  7.0 fl (7.5-11.1)  L  02/05/18  07:00    








 CMP











Sodium  136 mmol/L (136-145)   02/05/18  07:00    


 


Potassium  3.6 mmol/L (3.5-5.1)   02/05/18  07:00    


 


Chloride  96 mmol/L ()  L  02/05/18  07:00    


 


Carbon Dioxide  26 mmol/L (21-32)   02/05/18  07:00    


 


Anion Gap  14  (8-16)   02/05/18  07:00    


 


BUN  16 mg/dL (7-18)  D 02/05/18  10:45    


 


Creatinine  2.0 mg/dL (0.55-1.02)  H  02/05/18  10:45    


 


Creat Clearance w eGFR  7.54  (>60)   02/05/18  07:00    


 


Random Glucose  98 mg/dL ()   02/05/18  07:00    


 


Calcium  9.2 mg/dL (8.5-10.1)   02/05/18  07:00    


 


Total Bilirubin  0.5 mg/dL (0.2-1.0)   02/05/18  07:00    


 


AST  25 U/L (15-37)   02/05/18  07:00    


 


ALT  23 U/L (12-78)   02/05/18  07:00    


 


Alkaline Phosphatase  167 U/L ()  H  02/05/18  07:00    


 


Total Protein  6.9 g/dl (6.4-8.2)   02/05/18  07:00    


 


Albumin  2.3 g/dl (3.4-5.0)  L  02/05/18  07:00    








 CARDIAC ENZYMES











Creatine Kinase  448 IU/L ()  H  02/02/18  14:39    


 


Troponin I  < 0.02 ng/ml (0.00-0.05)   02/02/18  14:39    








 Current Medications











Generic Name Dose Route Start Last Admin





  Trade Name Preethi  PRN Reason Stop Dose Admin


 


Acetaminophen  650 mg  02/02/18 19:22  02/04/18 17:49





  Tylenol -  PO   650 mg





  Q4H PRN   Administration





  FEVER   


 


Apixaban  5 mg  02/03/18 10:00  02/05/18 12:03





  Eliquis -  PO   5 mg





  BID KEELY   Administration


 


Artificial Tears  1 drop  02/03/18 10:00  02/05/18 12:05





  Artificial Tears  OD   Not Given





  BID [facility]   


 


Piperacillin/Tazobactam/Dextrose  2.25 gm in 50 mls @ 100 mls/hr  02/03/18 10:

00  02/05/18 18:26





  Zosyn 2.25gm Ivpb (Premix)  IVPB   Not Given





  BID [facility]   





  Protocol   


 


Insulin Aspart  1 vial  02/03/18 07:00  02/05/18 17:11





  Novolog Vial Sliding Scale -  SQ   Not Given





  TIDAC [facility]   





  Protocol   


 


Ipratropium Bromide  1 amp  02/02/18 23:00  





  Atrovent 0.02% Nebulizer -  NEB   





  Q4H PRN   





  SHORTNESS OF BREATH   


 


Lactobacillus Acidophilus  1 tab  02/03/18 10:00  02/05/18 12:03





  Bacid -  GT   1 tab





  BID KEELY   Administration


 


Levetiracetam  500 mg  02/02/18 23:00  02/05/18 12:03





  Keppra Oral Solution -  GT   500 mg





  BID KEELY   Administration


 


Multivitamins  1 each  02/03/18 10:00  02/05/18 12:03





  Total B With C -  PO   1 each





  DAILY KEELY   Administration


 


Nystatin  1 applic  02/03/18 10:00  02/05/18 12:08





  Nystop Powder -  TP   1 applic





  DAILY [facility]   Administration


 


Paricalcitol  2 mcg  02/05/18 10:00  02/05/18 12:04





  Zemplar -  IVPUSH   Not Given





  [email or web address] [facility]   








 Home Medications











 Medication  Instructions  Recorded


 


Acetaminophen 650 mg GT Q6H PRN 02/02/18


 


Amlodipine Besylate [Norvasc -] 2.5 mg GT DAILY 02/02/18


 


Apixaban [Eliquis] 5 mg GT BID 02/02/18


 


Collagenase Clostridium Hist. 1 applic TP DAILY 02/02/18





[Santyl]  


 


Insulin (LOG) Aspart [NovoLOG -] 0 units SQ BID 02/02/18


 


Insulin NPH Hum/Reg Insulin Hm 8 unit SQ DAILY 02/02/18





[Novolin 70-30 100 Unit/ml Vial]  


 


Ipratropium 0.02% Nebulizer 1 neb NEB Q4H 02/02/18





[Atrovent]  


 


Lactobacillus Acidophilus 1 each GT BID 02/02/18





[Acidophilus]  


 


Levetiracetam [Keppra Oral 500 mg GT Q12H 02/02/18





Solution -]  


 


Nystatin Powder [Nystop Topical 60 gm TP DAILY 02/02/18





Powder -]  


 


Paricalcitol 2 mcg IV DAILY 02/02/18


 


Polyvinyl Alcohol [Artificial 15 ml OP BID 02/02/18





Tears]  


 


Sevelamer Carbonate 800 mg GT Q8H 02/02/18


 


Vitamin B Complex/Folic Acid 2,000 mcg GT DAILY 02/02/18





[B-Stress Capsules]

## 2018-02-05 NOTE — CONSULT
- Consultation


REQUESTING PROVIDER: 





CONSULT REQUEST: We have been asked to surgically evaluate this patient for (

specify).





PCP:Lisset Lowe





HPI: Called to ruby 61yo female with PMHx noted below. Has chronic stage 4 

sacral.


 


PMHx: ESRD on HD, HTN, Obesity, CVA, Iron deficiency anemia , Type II DM, C.diff

, Epilepsy, Stage IV decubitus ulcer, Schizophrenia, 


          SVC syndrome complicated by embolism and hemorrhage, GERD       





PSHx: LUE AVF/GRAFT       





Home Meds:


Acetaminophen 650 mg GT Q6H PRN 


Amlodipine Besylate [Norvasc -] 2.5 mg GT DAILY 


Apixaban [Eliquis] 5 mg GT BID 


Collagenase Clostridium Hist. [Santyl] 1 applic TP DAILY 


Insulin (LOG) Aspart [NovoLOG -] 0 units SQ BID 


Insulin NPH Hum/Reg Insulin Hm [Novolin 70-30 100 Unit/ml Vial] 8 unit SQ DAILY 


Ipratropium 0.02% Nebulizer [Atrovent] 1 neb NEB Q4H 


Lactobacillus Acidophilus [Acidophilus] 1 each GT BID 


Levetiracetam [Keppra Oral Solution -] 500 mg GT Q12H


Nystatin Powder [Nystop Topical Powder -] 60 gm TP DAILY


Paricalcitol 2 mcg IV DAILY


Polyvinyl Alcohol [Artificial Tears] 15 ml OP BID 


Sevelamer Carbonate 800 mg GT Q8H  


Vitamin B Complex/Folic Acid [B-Stress Capsules] 2,000 mcg GT DAILY 





Allergies: NKDA





REVIEW OF SYSTEMS:


Systems reviewed and considered negative except for whats contained in HPI.





PE:


GENERAL: Awake, alert, nad (s/p HD 2/5/18 --> removed 2kg)


LUE: avf/graft with palpable thrill. + radial pulse. hand warm


ABD: obese body habitus. soft. nt.


Sacrum: approx 8cm x 8cmx 8cm stage 4 decubitus ulcer, bone exposed. 

undermining of tissue at the 9 o'clock position approx 1.5". No purulent 

drainage or odor








 Last Vital Signs











Temp Pulse Resp BP Pulse Ox


 


 98.0 F   80   18   131/74   96 


 


 02/05/18 07:15  02/05/18 10:55  02/05/18 10:55  02/05/18 10:55  02/04/18 21:00








                                            Blood Type











Blood Type  O POSITIVE   02/03/18  11:10    








 CBC, BMP





 02/05/18 07:00 





 02/05/18 10:45 





 INR, PTT











INR  1.42  (0.82-1.09)  H  02/03/18  06:10    








Microbiology


02/03/18 03:30   Wound   Wound Culture - Final


                            Escherichia Coli


                            Morganella Morganii


                            Pending Organism


                            Pending Organism#2


02/02/18 14:39   Blood - Peripheral Venous   Blood Culture - Prelim NO GROWTH 

AFTER 48 HOURS, INCUBATION TO CONTINUE x3 days

















Problem List





- Problems


(1) Decubital ulcer


Assessment/Plan: 


Patient could benefit from a wound VAC while here in the hospital and continued 

at SNF. Will initiate therapy.


All supplies ordered and awaiting for delivery.


Case management notified.


Above plan discussed with Dr. Heredia and agrees


Code(s): L89.90 - PRESSURE ULCER OF UNSPECIFIED SITE, UNSPECIFIED STAGE   





(2) ESRD (end stage renal disease)


Code(s): N18.6 - END STAGE RENAL DISEASE   





Visit type





- Case Type


Case Type: ED Admission





- Emergency


Emergency Visit: Yes


ED Registration Date: 02/02/18


Care time: The patient presented to the Emergency Department on the above date 

and was hospitalized for further evaluation of their emergent condition.





- New patient


This patient is new to me today: Yes


Date on this admission: 02/05/18

## 2018-02-06 LAB
ANION GAP SERPL CALC-SCNC: 11 MMOL/L (ref 8–16)
BUN SERPL-MCNC: 29 MG/DL (ref 7–18)
CALCIUM SERPL-MCNC: 9.4 MG/DL (ref 8.5–10.1)
CHLORIDE SERPL-SCNC: 99 MMOL/L (ref 98–107)
CO2 SERPL-SCNC: 29 MMOL/L (ref 21–32)
CREAT SERPL-MCNC: 4 MG/DL (ref 0.55–1.02)
DEPRECATED RDW RBC AUTO: 17.9 % (ref 11.6–15.6)
GLUCOSE SERPL-MCNC: 132 MG/DL (ref 74–106)
HCT VFR BLD CALC: 29.7 % (ref 32.4–45.2)
HGB BLD-MCNC: 9.4 GM/DL (ref 10.7–15.3)
MAGNESIUM SERPL-MCNC: 2.4 MG/DL (ref 1.8–2.4)
MCH RBC QN AUTO: 28 PG (ref 25.7–33.7)
MCHC RBC AUTO-ENTMCNC: 31.8 G/DL (ref 32–36)
MCV RBC: 88.2 FL (ref 80–96)
PLATELET # BLD AUTO: 567 K/MM3 (ref 134–434)
PMV BLD: 6.9 FL (ref 7.5–11.1)
POTASSIUM SERPLBLD-SCNC: 3.5 MMOL/L (ref 3.5–5.1)
RBC # BLD AUTO: 3.37 M/MM3 (ref 3.6–5.2)
SODIUM SERPL-SCNC: 139 MMOL/L (ref 136–145)
WBC # BLD AUTO: 11.2 K/MM3 (ref 4–10)

## 2018-02-06 PROCEDURE — 3E10X8Z IRRIGATION OF SKIN AND MUCOUS MEMBRANES USING IRRIGATING SUBSTANCE: ICD-10-PCS | Performed by: PHYSICIAN ASSISTANT

## 2018-02-06 RX ADMIN — INSULIN ASPART SCH: 100 INJECTION, SOLUTION INTRAVENOUS; SUBCUTANEOUS at 16:33

## 2018-02-06 RX ADMIN — VITAMIN C SCH EACH: TAB at 09:53

## 2018-02-06 RX ADMIN — APIXABAN SCH MG: 5 TABLET, FILM COATED ORAL at 22:19

## 2018-02-06 RX ADMIN — POLYVINYL ALCOHOL SCH: 14 SOLUTION/ DROPS OPHTHALMIC at 09:54

## 2018-02-06 RX ADMIN — ACETAMINOPHEN PRN MG: 325 TABLET ORAL at 22:31

## 2018-02-06 RX ADMIN — Medication SCH TAB: at 22:27

## 2018-02-06 RX ADMIN — INSULIN ASPART SCH: 100 INJECTION, SOLUTION INTRAVENOUS; SUBCUTANEOUS at 06:07

## 2018-02-06 RX ADMIN — POLYVINYL ALCOHOL SCH: 14 SOLUTION/ DROPS OPHTHALMIC at 01:07

## 2018-02-06 RX ADMIN — INSULIN ASPART SCH UNITS: 100 INJECTION, SOLUTION INTRAVENOUS; SUBCUTANEOUS at 12:08

## 2018-02-06 RX ADMIN — POLYVINYL ALCOHOL SCH DROP: 14 SOLUTION/ DROPS OPHTHALMIC at 22:20

## 2018-02-06 RX ADMIN — APIXABAN SCH MG: 5 TABLET, FILM COATED ORAL at 09:53

## 2018-02-06 RX ADMIN — TAZOBACTAM SODIUM AND PIPERACILLIN SODIUM SCH MLS/HR: 250; 2 INJECTION, SOLUTION INTRAVENOUS at 09:53

## 2018-02-06 RX ADMIN — NYSTATIN SCH APPLIC: 100000 POWDER TOPICAL at 12:02

## 2018-02-06 RX ADMIN — Medication SCH TAB: at 09:53

## 2018-02-06 RX ADMIN — LEVETIRACETAM SCH MG: 100 SOLUTION ORAL at 09:53

## 2018-02-06 RX ADMIN — LEVETIRACETAM SCH MG: 100 SOLUTION ORAL at 22:19

## 2018-02-06 NOTE — PN
Teaching Attending Note


Name of Resident: Donta Butterfield





ATTENDING PHYSICIAN STATEMENT





I saw and evaluated the patient.


I reviewed the resident's note and discussed the case with the resident.


I agree with the resident's findings and plan as documented.








SUBJECTIVE:





Patient is comfortable with no acute distress, no shortness of breath.


OBJECTIVE:


 Vital Signs











Temperature  98.8 F   02/06/18 17:39


 


Pulse Rate  104 H  02/06/18 17:39


 


Respiratory Rate  20   02/06/18 17:39


 


Blood Pressure  139/82   02/06/18 17:39


 


O2 Sat by Pulse Oximetry (%)  99   02/06/18 09:00








 CBCD











WBC  11.2 K/mm3 (4.0-10.0)  H  02/06/18  06:45    


 


RBC  3.37 M/mm3 (3.60-5.2)  L D 02/06/18  06:45    


 


Hgb  9.4 GM/dL (10.7-15.3)  L D 02/06/18  06:45    


 


Hct  29.7 % (32.4-45.2)  L D 02/06/18  06:45    


 


MCV  88.2 fl (80-96)   02/06/18  06:45    


 


MCHC  31.8 g/dl (32.0-36.0)  L  02/06/18  06:45    


 


RDW  17.9 % (11.6-15.6)  H  02/06/18  06:45    


 


Plt Count  567 K/MM3 (134-434)  H  02/06/18  06:45    


 


MPV  6.9 fl (7.5-11.1)  L  02/06/18  06:45    








 CMP











Sodium  139 mmol/L (136-145)   02/06/18  06:45    


 


Potassium  3.5 mmol/L (3.5-5.1)   02/06/18  06:45    


 


Chloride  99 mmol/L ()   02/06/18  06:45    


 


Carbon Dioxide  29 mmol/L (21-32)   02/06/18  06:45    


 


Anion Gap  11  (8-16)   02/06/18  06:45    


 


BUN  29 mg/dL (7-18)  H  02/06/18  06:45    


 


Creatinine  4.0 mg/dL (0.55-1.02)  H  02/06/18  06:45    


 


Creat Clearance w eGFR  7.54  (>60)   02/05/18  07:00    


 


Random Glucose  132 mg/dL ()  H  02/06/18  06:45    


 


Calcium  9.4 mg/dL (8.5-10.1)   02/06/18  06:45    


 


Total Bilirubin  0.5 mg/dL (0.2-1.0)   02/05/18  07:00    


 


AST  25 U/L (15-37)   02/05/18  07:00    


 


ALT  23 U/L (12-78)   02/05/18  07:00    


 


Alkaline Phosphatase  167 U/L ()  H  02/05/18  07:00    


 


Total Protein  6.9 g/dl (6.4-8.2)   02/05/18  07:00    


 


Albumin  2.3 g/dl (3.4-5.0)  L  02/05/18  07:00    








 CARDIAC ENZYMES











Creatine Kinase  448 IU/L ()  H  02/02/18  14:39    


 


Troponin I  < 0.02 ng/ml (0.00-0.05)   02/02/18  14:39    








 Current Medications











Generic Name Dose Route Start Last Admin





  Trade Name Freq  PRN Reason Stop Dose Admin


 


Acetaminophen  650 mg  02/02/18 19:22  02/05/18 23:25





  Tylenol -  PO   650 mg





  Q4H PRN   Administration





  FEVER   


 


Apixaban  5 mg  02/03/18 10:00  02/06/18 09:53





  Eliquis -  PO   5 mg





  BID KEELY   Administration


 


Artificial Tears  1 drop  02/03/18 10:00  02/06/18 09:54





  Artificial Tears  OD   Not Given





  BID KEELY   


 


CEFTRIAXONE 1 G/50 ML PREMIX  50 mls @ 100 mls/hr  02/06/18 15:30  02/06/18 16:

23





  Ceftriaxone 1 Gm-D5w Bag  IVPB   100 mls/hr





  DAILY KEELY   Administration


 


Metronidazole  500 mg in 100 mls @ 100 mls/hr  02/06/18 15:30  02/06/18 17:05





  Flagyl 500mg Premixed Ivpb -  IVPB   100 mls/hr





  Q8H-IV KEELY   Administration


 


Insulin Aspart  1 vial  02/03/18 07:00  02/06/18 16:33





  Novolog Vial Sliding Scale -  SQ   Not Given





  TIDAC [facility]ending [facility] to [facility]   





  Protocol   


 


Ipratropium Bromide  1 amp  02/02/18 23:00  





  Atrovent 0.02% Nebulizer -  NEB   





  Q4H PRN   





  SHORTNESS OF BREATH   


 


Lactobacillus Acidophilus  1 tab  02/03/18 10:00  02/06/18 09:53





  Bacid -  GT   1 tab





  BID KEELY   Administration


 


Levetiracetam  500 mg  02/02/18 23:00  02/06/18 09:53





  Keppra Oral Solution -  GT   500 mg





  BID KEELY   Administration


 


Multivitamins  1 each  02/03/18 10:00  02/06/18 09:53





  Total B With C -  PO   1 each





  DAILY KEELY   Administration


 


Nystatin  1 applic  02/03/18 10:00  02/06/18 12:02





  Nystop Powder -  TP   1 applic





  DAILY KEELY   Administration


 


Paricalcitol  2 mcg  02/05/18 10:00  02/05/18 12:04





  Zemplar -  IVPUSH   Not Given





  [email or web address] [facility]   








 Home Medications











 Medication  Instructions  Recorded


 


Acetaminophen 650 mg GT Q6H PRN 02/02/18


 


Amlodipine Besylate [Norvasc -] 2.5 mg GT DAILY 02/02/18


 


Apixaban [Eliquis] 5 mg GT BID 02/02/18


 


Collagenase Clostridium Hist. 1 applic TP DAILY 02/02/18





[Santyl]  


 


Insulin (LOG) Aspart [NovoLOG -] 0 units SQ BID 02/02/18


 


Insulin NPH Hum/Reg Insulin Hm 8 unit SQ DAILY 02/02/18





[Novolin 70-30 100 Unit/ml Vial]  


 


Ipratropium 0.02% Nebulizer 1 neb NEB Q4H 02/02/18





[Atrovent]  


 


Lactobacillus Acidophilus 1 each GT BID 02/02/18





[Acidophilus]  


 


Levetiracetam [Keppra Oral 500 mg GT Q12H 02/02/18





Solution -]  


 


Nystatin Powder [Nystop Topical 60 gm TP DAILY 02/02/18





Powder -]  


 


Paricalcitol 2 mcg IV DAILY 02/02/18


 


Polyvinyl Alcohol [Artificial 15 ml OP BID 02/02/18





Tears]  


 


Sevelamer Carbonate 800 mg GT Q8H 02/02/18


 


Vitamin B Complex/Folic Acid 2,000 mcg GT DAILY 02/02/18





[B-Stress Capsules]  








PE: per resident's note


Sacral decubitus: stage IV very deep, wound is clean.





ASSESSMENT AND PLAN:


62 year old female with a hx of sacral decubitus, tardive diskinesia, ESRD on HD

, HTN, SVC syndrome/clot, iron-deficiency anemia, Type 2 diabetes, GERD, and 

epilepsy is admitted to the hospital for sepsis 2/2 sacral decubitus





#Acute sepsis due to sacral ulcer stage IV IV antibiotic changes to Rocephin 

and Flagyl , patient went for Xray of sacrum to r/o Osteo which might be 

possibility s/p  zosyn, vancomycin.





#ARF over chronic appreciate nephro consult





#Electrolyte imbalance replete as needed.





#Tardive Dyskinesia: stable now due to antipsychotic use - stopped 6 months ago

, neuro on the case





#Anemia: stable transfuse below 7 s/p one unit.





#Seizure Disorder: continue keppra 500 BID





#Hx of SVC syndrome/clot: on eliquis 5mg BID continue





Dvt Px: Eliquis

## 2018-02-06 NOTE — PN
Physical Exam: 


SUBJECTIVE: Patient seen and examined at bedside. Patient woke up but was not 

responding to my questions. Did not appear in any acute distress.








OBJECTIVE:





 Vital Signs











 Period  Temp  Pulse  Resp  BP Sys/Brown  Pulse Ox


 


 Last 24 Hr  97 F-100.4 F    18-20  102-131/42-74  96-97











GENERAL: The patient is awake, alert, and fully oriented, in no acute distress.


HEAD: Normal with no signs of trauma.


LUNGS: Breath sounds equal, clear to auscultation bilaterally, no wheezes, no 

crackles, no accessory muscle use. 


HEART: 3/6 holosystolic murmur, S1, S2 without murmur, rub or gallop.


ABDOMEN: Obese, Soft, nontender, nondistended, normoactive bowel sounds, no 

guarding, no rebound, no hepatosplenomegaly, no masses.


EXTREMITIES: 2+ pulses, warm, well-perfused, no edema. Surgical scars noted on 

R arm representative of previous graft/fistula. large stage 4 Sacral decubitus 

- wound vac in place draining serosanguinous fluid


NEUROLOGICAL: Cranial nerves II through XII grossly intact. Lip smacking 

observed


PSYCH: Normal mood, normal affect.


SKIN: Warm, dry, normal turgor, no rashes or lesions noted














 Laboratory Results - last 24 hr











  02/03/18 02/05/18 02/05/18





  11:10 07:00 07:00


 


WBC   


 


RBC   


 


Hgb   


 


Hct   


 


MCV   


 


MCH   


 


MCHC   


 


RDW   


 


Plt Count   


 


MPV   


 


Sodium   136 


 


Potassium   3.6 


 


Chloride   96 L 


 


Carbon Dioxide   26 


 


Anion Gap   14 


 


BUN   51 H 


 


Creatinine   5.7 H 


 


Creat Clearance w eGFR   7.54 


 


POC Glucometer   


 


Random Glucose   98 


 


Calcium   9.2 


 


Magnesium   


 


Total Bilirubin   0.5 


 


AST   25 


 


ALT   23 


 


Alkaline Phosphatase   167 H 


 


Total Protein   6.9 


 


Albumin   2.3 L 


 


Hepatitis C Antibody    <0.1


 


Blood Type  O POSITIVE  


 


Crossmatch  See Detail  














  02/05/18 02/05/18 02/05/18





  10:45 12:17 17:06


 


WBC   


 


RBC   


 


Hgb   


 


Hct   


 


MCV   


 


MCH   


 


MCHC   


 


RDW   


 


Plt Count   


 


MPV   


 


Sodium   


 


Potassium   


 


Chloride   


 


Carbon Dioxide   


 


Anion Gap   


 


BUN  16  D  


 


Creatinine  2.0 H  


 


Creat Clearance w eGFR   


 


POC Glucometer   106  102


 


Random Glucose   


 


Calcium   


 


Magnesium   


 


Total Bilirubin   


 


AST   


 


ALT   


 


Alkaline Phosphatase   


 


Total Protein   


 


Albumin   


 


Hepatitis C Antibody   


 


Blood Type   


 


Crossmatch   














  02/06/18 02/06/18 02/06/18





  00:48 01:48 05:56


 


WBC   


 


RBC   


 


Hgb   


 


Hct   


 


MCV   


 


MCH   


 


MCHC   


 


RDW   


 


Plt Count   


 


MPV   


 


Sodium   


 


Potassium   


 


Chloride   


 


Carbon Dioxide   


 


Anion Gap   


 


BUN   


 


Creatinine   


 


Creat Clearance w eGFR   


 


POC Glucometer  160  132  146


 


Random Glucose   


 


Calcium   


 


Magnesium   


 


Total Bilirubin   


 


AST   


 


ALT   


 


Alkaline Phosphatase   


 


Total Protein   


 


Albumin   


 


Hepatitis C Antibody   


 


Blood Type   


 


Crossmatch   














  02/06/18 02/06/18





  06:45 06:45


 


WBC  11.2 H 


 


RBC  3.37 L D 


 


Hgb  9.4 L D 


 


Hct  29.7 L D 


 


MCV  88.2 


 


MCH  28.0 


 


MCHC  31.8 L 


 


RDW  17.9 H 


 


Plt Count  567 H 


 


MPV  6.9 L 


 


Sodium   139


 


Potassium   3.5


 


Chloride   99


 


Carbon Dioxide   29


 


Anion Gap   11


 


BUN   29 H


 


Creatinine   4.0 H


 


Creat Clearance w eGFR  


 


POC Glucometer  


 


Random Glucose   132 H


 


Calcium   9.4


 


Magnesium   2.4


 


Total Bilirubin  


 


AST  


 


ALT  


 


Alkaline Phosphatase  


 


Total Protein  


 


Albumin  


 


Hepatitis C Antibody  


 


Blood Type  


 


Crossmatch  








Active Medications











Generic Name Dose Route Start Last Admin





  Trade Name Freq  PRN Reason Stop Dose Admin


 


Acetaminophen  650 mg  02/02/18 19:22  02/05/18 23:25





  Tylenol -  PO   650 mg





  Q4H PRN   Administration





  FEVER   


 


Apixaban  5 mg  02/03/18 10:00  02/05/18 23:24





  Eliquis -  PO   5 mg





  BID KEELY   Administration


 


Artificial Tears  1 drop  02/03/18 10:00  02/06/18 01:07





  Artificial Tears  OD   Not Given





  BID KEELY   


 


Piperacillin/Tazobactam/Dextrose  2.25 gm in 50 mls @ 100 mls/hr  02/03/18 10:

00  02/05/18 23:26





  Zosyn 2.25gm Ivpb (Premix)  IVPB   100 mls/hr





  BID KEELY   Administration





  Protocol   


 


Insulin Aspart  1 vial  02/03/18 07:00  02/06/18 06:07





  Novolog Vial Sliding Scale -  SQ   Not Given





  TIDAC [facility]   





  Protocol   


 


Ipratropium Bromide  1 amp  02/02/18 23:00  





  Atrovent 0.02% Nebulizer -  NEB   





  Q4H PRN   





  SHORTNESS OF BREATH   


 


Lactobacillus Acidophilus  1 tab  02/03/18 10:00  02/05/18 23:25





  Bacid -  GT   1 tab





  BID KEELY   Administration


 


Levetiracetam  500 mg  02/02/18 23:00  02/05/18 23:24





  Keppra Oral Solution -  GT   500 mg





  BID KEELY   Administration


 


Multivitamins  1 each  02/03/18 10:00  02/05/18 12:03





  Total B With C -  PO   1 each





  DAILY KEELY   Administration


 


Nystatin  1 applic  02/03/18 10:00  02/05/18 12:08





  Nystop Powder -  TP   1 applic





  DAILY KEELY   Administration


 


Paricalcitol  2 mcg  02/05/18 10:00  02/05/18 12:04





  Zemplar -  IVPUSH   Not Given





  MoWeFr@1000 [facility]   











ASSESSMENT/PLAN:





62 year old female with a hx of sacral decubitus, tardive diskinesia, ESRD on HD

, HTN, SVC syndrome/clot, iron-deficiency anemia, Type 2 diabetes, GERD, and 

epilepsy is admitted to the hospital for sepsis 2/2 sacral decubitus





#Sepsis 2/2 sacral ulcer: 


-start ceftriaxone/flagyl per ID


-D/C zosyn


-appreciate ID consultation


-blood cultures negative


-wound cultures grew multiple organisms, await sensitivities


-lactic acid normalized


-wound vac in place - Brittany sent out for SNF to take care of wound vac T/Thu/Sat


-tylenol 650 PRN pain


-likely need MRI to rule out osteomyelitis





#Tardive Dyskinesia: stable, was due to antipsychotic use - stopped 6 months ago


-appreciate neuro consultation





#ESRD: stable, on HD


-HD planned for AM


-appreciate nephrology recommendations





#Anemia: stable


-transfuse as necessary if hgb < 7





#Seizure Disorder: stable


-continue keppra 500 BID





#SVC syndrome/clot: stable


-continue eliquis 5mg BID





#FEN


-tube feed nepro


-careful with repletion


-no standing fluids





#Prophylaxis


-On eliquis





#Disposition:


-possible DC to SNF





Visit type





- Emergency Visit


Emergency Visit: No





- New Patient


This patient is new to me today: No





- Critical Care


Critical Care patient: No

## 2018-02-06 NOTE — PN
Progress Note, Physician


History of Present Illness: 





Pt seen and examined at bedside. She appears comfortable. 





- Current Medication List


Current Medications: 


Active Medications





Acetaminophen (Tylenol -)  650 mg PO Q4H PRN


   PRN Reason: FEVER


   Last Admin: 02/05/18 23:25 Dose:  650 mg


Apixaban (Eliquis -)  5 mg PO BID [facility]


   Last Admin: 02/06/18 09:53 Dose:  5 mg


Artificial Tears (Artificial Tears)  1 drop OD BID [facility]


   Last Admin: 02/06/18 09:54 Dose:  Not Given


CEFTRIAXONE 1 G/50 ML PREMIX (Ceftriaxone 1 Gm-D5w Bag)  50 mls @ 100 mls/hr 

IVPB DAILY [facility]


Metronidazole (Flagyl 500mg Premixed Ivpb -)  500 mg in 100 mls @ 100 mls/hr 

IVPB Q8H-IV KE[facility]


Insulin Aspart (Novolog Vial Sliding Scale -)  1 vial SQ TIDAC [facility]


   PRN Reason: Protocol


   Last Admin: 02/06/18 12:08 Dose:  2 units


Ipratropium Bromide (Atrovent 0.02% Nebulizer -)  1 amp NEB Q4H PRN


   PRN Reason: SHORTNESS OF BREATH


Lactobacillus Acidophilus (Bacid -)  1 tab GT BID [facility]


   Last Admin: 02/06/18 09:53 Dose:  1 tab


Levetiracetam (Keppra Oral Solution -)  500 mg GT BID [facility]


   Last Admin: 02/06/18 09:53 Dose:  500 mg


Multivitamins (Total B With C -)  1 each PO DAILY [facility]


   Last Admin: 02/06/18 09:53 Dose:  1 each


Nystatin (Nystop Powder -)  1 applic TP DAILY [facility]


   Last Admin: 02/06/18 12:02 Dose:  1 applic


Paricalcitol (Zemplar -)  2 mcg IVPUSH MoWeFr@1000 [facility]


   Last Admin: 02/05/18 12:04 Dose:  Not Given











- Objective


Vital Signs: 


 Vital Signs











Temperature  99.9 F H  02/06/18 14:10


 


Pulse Rate  91 H  02/06/18 14:10


 


Respiratory Rate  20   02/06/18 14:10


 


Blood Pressure  121/80   02/06/18 14:10


 


O2 Sat by Pulse Oximetry (%)  99   02/06/18 09:00











Constitutional: Yes: Calm


Eyes: Yes: Conjunctiva Clear


Cardiovascular: Yes: S1, S2


Respiratory: Yes: CTA Bilaterally


Gastrointestinal: Yes: Soft, Other (peg)


Genitourinary: Yes: Incontinence


Musculoskeletal: Yes: Muscle Weakness


Edema: Yes


Edema: LLE: Trace, RLE: Trace


Neurological: Yes: Pre-Existing Deficit


Labs: 


 CBC, BMP





 02/06/18 06:45 





 02/06/18 06:45 





 INR, PTT











INR  1.42  (0.82-1.09)  H  02/03/18  06:10    














Problem List





- Problems


(1) Decubital ulcer


Code(s): L89.90 - PRESSURE ULCER OF UNSPECIFIED SITE, UNSPECIFIED STAGE   





(2) ESRD (end stage renal disease)


Code(s): N18.6 - END STAGE RENAL DISEASE   





(3) Sepsis


Code(s): A41.9 - SEPSIS, UNSPECIFIED ORGANISM   





Assessment/Plan


 Current Medications











Generic Name Dose Route Start Last Admin





  Trade Name Freq  PRN Reason Stop Dose Admin


 


Acetaminophen  650 mg  02/02/18 19:22  02/05/18 23:25





  Tylenol -  PO   650 mg





  Q4H PRN   Administration





  FEVER   


 


Apixaban  5 mg  02/03/18 10:00  02/06/18 09:53





  Eliquis -  PO   5 mg





  BID KEELY   Administration


 


Artificial Tears  1 drop  02/03/18 10:00  02/06/18 09:54





  Artificial Tears  OD   Not Given





  BID KEELY   


 


CEFTRIAXONE 1 G/50 ML PREMIX  50 mls @ 100 mls/hr  02/06/18 15:30  





  Ceftriaxone 1 Gm-D5w Bag  IVPB   





  DAILY KEELY   


 


Metronidazole  500 mg in 100 mls @ 100 mls/hr  02/06/18 15:30  





  Flagyl 500mg Premixed Ivpb -  IVPB   





  Q8H-IV KEELY   


 


Insulin Aspart  1 vial  02/03/18 07:00  02/06/18 12:08





  Novolog Vial Sliding Scale -  SQ   2 units





  TIDAC KEELY   Administration





  Protocol   


 


Ipratropium Bromide  1 amp  02/02/18 23:00  





  Atrovent 0.02% Nebulizer -  NEB   





  Q4H PRN   





  SHORTNESS OF BREATH   


 


Lactobacillus Acidophilus  1 tab  02/03/18 10:00  02/06/18 09:53





  Bacid -  GT   1 tab





  BID KEELY   Administration


 


Levetiracetam  500 mg  02/02/18 23:00  02/06/18 09:53





  Keppra Oral Solution -  GT   500 mg





  BID KEELY   Administration


 


Multivitamins  1 each  02/03/18 10:00  02/06/18 09:53





  Total B With C -  PO   1 each





  DAILY KEELY   Administration


 


Nystatin  1 applic  02/03/18 10:00  02/06/18 12:02





  Nystop Powder -  TP   1 applic





  DAILY KEELY   Administration


 


Paricalcitol  2 mcg  02/05/18 10:00  02/05/18 12:04





  Zemplar -  IVPUSH   Not Given





  MoWeFr@1000 [facility]   

















Impression


1. ESRD


2. anemia


3. change in mental status


4. epilepsy


5. htn


6. dm


7. gerd


8. obesity





Plan


- HD in am


- cont current meds


- monitor BP


- hg is stable


- renal dose abx


- will follow


Dr Young

## 2018-02-06 NOTE — PROC
Procedure Note


Procedure: 





WOUND VAC PLACEMENT





Sacral region cleansed.


Wound measures 8cm x 8cm x 8cm.


Some minor oozing on edges --> + hemostasis w/ silver sticks.


White foam placed to base of wound covering exposed bone.


Black foam placed just inside of wound borders to encourage contracture.


Occlusive dressing applied along with bridge to right hip.


VAC set 125mmHg.


Foam collapsed as anticipated.


Dressing change T-TH-Sat.


No complications.


Patient tolerated procedure well.

## 2018-02-06 NOTE — PN
Progress Note, Physician


History of Present Illness: 





Awake, responsive 


Offers no complaints


Low grade temp


Wound c/s mixed organisms


BC no growth





- Current Medication List


Current Medications: 


Active Medications





Acetaminophen (Tylenol -)  650 mg PO Q4H PRN


   PRN Reason: FEVER


   Last Admin: 02/05/18 23:25 Dose:  650 mg


Apixaban (Eliquis -)  5 mg PO BID [facility]


   Last Admin: 02/06/18 09:53 Dose:  5 mg


Artificial Tears (Artificial Tears)  1 drop OD BID [facility]


   Last Admin: 02/06/18 09:54 Dose:  Not Given


Insulin Aspart (Novolog Vial Sliding Scale -)  1 vial SQ TIDAC [facility]


   PRN Reason: Protocol


   Last Admin: 02/06/18 12:08 Dose:  2 units


Ipratropium Bromide (Atrovent 0.02% Nebulizer -)  1 amp NEB Q4H PRN


   PRN Reason: SHORTNESS OF BREATH


Lactobacillus Acidophilus (Bacid -)  1 tab GT BID [facility]


   Last Admin: 02/06/18 09:53 Dose:  1 tab


Levetiracetam (Keppra Oral Solution -)  500 mg GT BID [facility]


   Last Admin: 02/06/18 09:53 Dose:  500 mg


Multivitamins (Total B With C -)  1 each PO DAILY [facility]


   Last Admin: 02/06/18 09:53 Dose:  1 each


Nystatin (Nystop Powder -)  1 applic TP DAILY [facility]


   Last Admin: 02/06/18 12:02 Dose:  1 applic


Paricalcitol (Zemplar -)  2 mcg IVPUSH MoWeFr@1000 [facility]


   Last Admin: 02/05/18 12:04 Dose:  Not Given











- Objective


Vital Signs: 


 Vital Signs











Temperature  99.9 F H  02/06/18 14:10


 


Pulse Rate  91 H  02/06/18 14:10


 


Respiratory Rate  20   02/06/18 14:10


 


Blood Pressure  121/80   02/06/18 14:10


 


O2 Sat by Pulse Oximetry (%)  99   02/06/18 09:00











Constitutional: Yes: Obese


Cardiovascular: Yes: Regular Rate and Rhythm, S1, S2


Respiratory: Yes: CTA Bilaterally


Gastrointestinal: Yes: Normal Bowel Sounds, Soft, Abdomen, Obese.  No: 

Tenderness


Edema: Yes


Integumentary: Yes: Other (+ sacral decubitus ulcer)


Labs: 


 CBC, BMP





 02/06/18 06:45 





 02/06/18 06:45 





 INR, PTT











INR  1.42  (0.82-1.09)  H  02/03/18  06:10    














Assessment/Plan





S/P rapid response ? seizure


Sacral decubitus ulcer


ESRD


Substitute ceftriaxone/ flagyl


Local wound care

## 2018-02-07 LAB
ANION GAP SERPL CALC-SCNC: 12 MMOL/L (ref 8–16)
BUN SERPL-MCNC: 42 MG/DL (ref 7–18)
CALCIUM SERPL-MCNC: 9.5 MG/DL (ref 8.5–10.1)
CHLORIDE SERPL-SCNC: 98 MMOL/L (ref 98–107)
CO2 SERPL-SCNC: 29 MMOL/L (ref 21–32)
CREAT SERPL-MCNC: 5.1 MG/DL (ref 0.55–1.02)
DEPRECATED RDW RBC AUTO: 17.6 % (ref 11.6–15.6)
GLUCOSE SERPL-MCNC: 158 MG/DL (ref 74–106)
HCT VFR BLD CALC: 27 % (ref 32.4–45.2)
HGB BLD-MCNC: 8.4 GM/DL (ref 10.7–15.3)
MAGNESIUM SERPL-MCNC: 2.5 MG/DL (ref 1.8–2.4)
MCH RBC QN AUTO: 27.6 PG (ref 25.7–33.7)
MCHC RBC AUTO-ENTMCNC: 31.1 G/DL (ref 32–36)
MCV RBC: 88.5 FL (ref 80–96)
PHOSPHATE SERPL-MCNC: 3.4 MG/DL (ref 2.5–4.9)
PLATELET # BLD AUTO: 514 K/MM3 (ref 134–434)
PMV BLD: 6.9 FL (ref 7.5–11.1)
POTASSIUM SERPLBLD-SCNC: 3.3 MMOL/L (ref 3.5–5.1)
RBC # BLD AUTO: 3.05 M/MM3 (ref 3.6–5.2)
SODIUM SERPL-SCNC: 139 MMOL/L (ref 136–145)
WBC # BLD AUTO: 11.2 K/MM3 (ref 4–10)

## 2018-02-07 RX ADMIN — INSULIN ASPART SCH: 100 INJECTION, SOLUTION INTRAVENOUS; SUBCUTANEOUS at 06:47

## 2018-02-07 RX ADMIN — ACETAMINOPHEN PRN MG: 325 TABLET ORAL at 12:11

## 2018-02-07 RX ADMIN — ACETAMINOPHEN PRN MG: 325 TABLET ORAL at 22:38

## 2018-02-07 RX ADMIN — POLYVINYL ALCOHOL SCH DROP: 14 SOLUTION/ DROPS OPHTHALMIC at 12:02

## 2018-02-07 RX ADMIN — LEVETIRACETAM SCH MG: 100 SOLUTION ORAL at 22:38

## 2018-02-07 RX ADMIN — LEVETIRACETAM SCH MG: 100 SOLUTION ORAL at 12:03

## 2018-02-07 RX ADMIN — VITAMIN C SCH EACH: TAB at 12:03

## 2018-02-07 RX ADMIN — Medication SCH TAB: at 22:37

## 2018-02-07 RX ADMIN — APIXABAN SCH MG: 5 TABLET, FILM COATED ORAL at 22:37

## 2018-02-07 RX ADMIN — INSULIN ASPART SCH: 100 INJECTION, SOLUTION INTRAVENOUS; SUBCUTANEOUS at 13:58

## 2018-02-07 RX ADMIN — PARICALCITOL SCH MCG: 5 INJECTION, SOLUTION INTRAVENOUS at 08:54

## 2018-02-07 RX ADMIN — POLYVINYL ALCOHOL SCH DROP: 14 SOLUTION/ DROPS OPHTHALMIC at 22:37

## 2018-02-07 RX ADMIN — PARICALCITOL SCH: 5 INJECTION, SOLUTION INTRAVENOUS at 12:04

## 2018-02-07 RX ADMIN — INSULIN ASPART SCH: 100 INJECTION, SOLUTION INTRAVENOUS; SUBCUTANEOUS at 16:40

## 2018-02-07 RX ADMIN — APIXABAN SCH MG: 5 TABLET, FILM COATED ORAL at 12:03

## 2018-02-07 RX ADMIN — NYSTATIN SCH: 100000 POWDER TOPICAL at 22:08

## 2018-02-07 RX ADMIN — Medication SCH TAB: at 12:03

## 2018-02-07 NOTE — PN
Progress Note, SLP





- Note


Progress Note: 


 Selected Entries











  02/06/18 02/06/18 02/06/18





  02:00 06:13 09:18


 


Breakfast   


 


Temperature 100 F H 98.9 F 98.9 F














  02/06/18 02/06/18 02/06/18





  14:10 17:39 22:00


 


Breakfast   


 


Temperature 99.9 F H 98.8 F 100.5 F H














  02/07/18 02/07/18 02/07/18





  01:58 04:57 06:45


 


Breakfast   


 


Temperature 98.7 F 99.6 F 98.1 F














  02/07/18





  09:53


 


Breakfast NPO


 


Temperature 








 Laboratory Tests











  02/05/18 02/06/18 02/07/18





  07:00 06:45 06:50


 


WBC  11.8 H  11.2 H  11.2 H











MBS reviewed with staff. 


Suggest continue TF nocturnally to facilitate wound healing and initiate PO 

diet during the day.


RD consult.


Monitor PO tolerance.

## 2018-02-07 NOTE — PN
Teaching Attending Note


Name of Resident: Donta Butterfield





ATTENDING PHYSICIAN STATEMENT





I saw and evaluated the patient.


I reviewed the resident's note and discussed the case with the resident.


I agree with the resident's findings and plan as documented.








SUBJECTIVE:


No events . denies any pain or SOB 





OBJECTIVE:


NAD, R mouth angle pulled to R . no other asymmetry . 


Cv: RRR


Lungs: biabasilar crackles 


Abd:  soft, Nt, ND , NL BS 


Skin: wound vac over sacral ulcer .  stage 2 at posterior upper R thigh . no 

drainage 


Ext: 1 + edema 





A/P 





Unfortunate 63 y/o lady with h/o TD , ESRD, VC syndrome , iron def anemia , 

sacral decub, Dm II , and Ht and other medical problems who presented from 

wound care with unusual facial movements . 





1- sacral decub ulcer. possible infection 


- ceftriaxon and flagyl


- MRI to r/o OM .


- d/w ID by resident to evaluate for decision on Abx 





2- ESRD: cont HD per renal 





3- TD: chronic . monitor 


off her antiphsycotics 





4- h/o seizure , cont keppra 





5- SVc syndrome : cont eliquis 











HLOC pending MRI

## 2018-02-07 NOTE — PN
Progress Note, Physician


History of Present Illness: 





Pt seen and examined at bedside. She tolerated HD today. 





- Current Medication List


Current Medications: 


Active Medications





Acetaminophen (Tylenol -)  650 mg PO Q4H PRN


   PRN Reason: FEVER


   Last Admin: 02/07/18 12:11 Dose:  650 mg


Apixaban (Eliquis -)  5 mg PO BID [facility]


   Last Admin: 02/07/18 12:03 Dose:  5 mg


Artificial Tears (Artificial Tears)  1 drop OD BID [facility]


   Last Admin: 02/07/18 12:02 Dose:  1 drop


Ceftriaxone Sodium 1 gm/ (Dextrose)  50 mls @ 100 mls/hr IVPB Q24H KEELY


Metronidazole (Flagyl 250mg Premixed Ivpb -)  250 mg in 50 mls @ 50 mls/hr IVPB 

Q8H-IV KEELY


Insulin Aspart (Novolog Vial Sliding Scale -)  1 vial SQ TIDAC [facility]


   PRN Reason: Protocol


   Last Admin: 02/07/18 06:47 Dose:  Not Given


Ipratropium Bromide (Atrovent 0.02% Nebulizer -)  1 amp NEB Q4H PRN


   PRN Reason: SHORTNESS OF BREATH


Lactobacillus Acidophilus (Bacid -)  1 tab GT BID [facility]


   Last Admin: 02/07/18 12:03 Dose:  1 tab


Levetiracetam (Keppra Oral Solution -)  500 mg GT BID [facility]


   Last Admin: 02/07/18 12:03 Dose:  500 mg


Multivitamins (Total B With C -)  1 each PO DAILY [facility]


   Last Admin: 02/07/18 12:03 Dose:  1 each


Nystatin (Nystop Powder -)  1 applic TP DAILY [facility]


   Last Admin: 02/06/18 12:02 Dose:  1 applic


Paricalcitol (Zemplar -)  2 mcg IVPUSH MoWeFr@1000 [facility]


   Last Admin: 02/07/18 12:04 Dose:  Not Given











- Objective


Vital Signs: 


 Vital Signs











Temperature  98.1 F   02/07/18 06:45


 


Pulse Rate  69   02/07/18 10:25


 


Respiratory Rate  18   02/07/18 10:25


 


Blood Pressure  126/59   02/07/18 10:25


 


O2 Sat by Pulse Oximetry (%)  98   02/06/18 21:00











Constitutional: Yes: Calm


Eyes: Yes: Conjunctiva Clear


HENT: Yes: Atraumatic


Cardiovascular: Yes: S1, S2


Respiratory: Yes: CTA Bilaterally


Gastrointestinal: Yes: Other (peg)


Genitourinary: Yes: Incontinence


Musculoskeletal: Yes: Muscle Weakness


Edema: Yes


Edema: LLE: Trace, RLE: Trace


Neurological: Yes: Pre-Existing Deficit


Labs: 


 CBC, BMP





 02/07/18 06:50 





 02/07/18 06:50 





 INR, PTT











INR  1.42  (0.82-1.09)  H  02/03/18  06:10    














Problem List





- Problems


(1) Decubital ulcer


Code(s): L89.90 - PRESSURE ULCER OF UNSPECIFIED SITE, UNSPECIFIED STAGE   





(2) ESRD (end stage renal disease)


Code(s): N18.6 - END STAGE RENAL DISEASE   





(3) Sepsis


Code(s): A41.9 - SEPSIS, UNSPECIFIED ORGANISM   





Assessment/Plan


 Current Medications











Generic Name Dose Route Start Last Admin





  Trade Name Freq  PRN Reason Stop Dose Admin


 


Acetaminophen  650 mg  02/02/18 19:22  02/07/18 12:11





  Tylenol -  PO   650 mg





  Q4H PRN   Administration





  FEVER   


 


Apixaban  5 mg  02/03/18 10:00  02/07/18 12:03





  Eliquis -  PO   5 mg





  BID KEELY   Administration


 


Artificial Tears  1 drop  02/03/18 10:00  02/07/18 12:02





  Artificial Tears  OD   1 drop





  BID KEELY   Administration


 


Ceftriaxone Sodium 1 gm/  50 mls @ 100 mls/hr  02/07/18 12:30  





  Dextrose  IVPB   





  Q24H KEELY   


 


Metronidazole  250 mg in 50 mls @ 50 mls/hr  02/07/18 12:30  





  Flagyl 250mg Premixed Ivpb -  IVPB   





  Q8H-IV KEELY   


 


Insulin Aspart  1 vial  02/03/18 07:00  02/07/18 06:47





  Novolog Vial Sliding Scale -  SQ   Not Given





  TIDAC [facility]   





  Protocol   


 


Ipratropium Bromide  1 amp  02/02/18 23:00  





  Atrovent 0.02% Nebulizer -  NEB   





  Q4H PRN   





  SHORTNESS OF BREATH   


 


Lactobacillus Acidophilus  1 tab  02/03/18 10:00  02/07/18 12:03





  Bacid -  GT   1 tab





  BID KEELY   Administration


 


Levetiracetam  500 mg  02/02/18 23:00  02/07/18 12:03





  Keppra Oral Solution -  GT   500 mg





  BID KEELY   Administration


 


Multivitamins  1 each  02/03/18 10:00  02/07/18 12:03





  Total B With C -  PO   1 each





  DAILY KEELY   Administration


 


Nystatin  1 applic  02/03/18 10:00  02/06/18 12:02





  Nystop Powder -  TP   1 applic





  DAILY KEELY   Administration


 


Paricalcitol  2 mcg  02/05/18 10:00  02/07/18 12:04





  Zemplar -  IVPUSH   Not Given





  MoWeFr@1000 [facility]   














Impression


1. ESRD


2. anemia


3. change in mental status


4. epilepsy


5. htn


6. dm


7. gerd


8. obesity





Plan


- pt tolerated HD today


- monitor hg


- cont current management


- renal dose abx


- will follow


Dr Young

## 2018-02-07 NOTE — PN
Physical Exam: 


SUBJECTIVE: Patient seen and examined in hemodialysis center. No acute 

overnight events. Patient is not too arousable and wasnt answering questions 

while on dialysis








OBJECTIVE:





 Vital Signs











 Period  Temp  Pulse  Resp  BP Sys/Brown  Pulse Ox


 


 Last 24 Hr  98.1 F-100.5 F    18-20  /43-82  98-98











GENERAL: The patient is awake, alert, and fully oriented, in no acute distress.


HEAD: Normal with no signs of trauma.


LUNGS: Breath sounds equal, clear to auscultation bilaterally, no wheezes, no 

crackles, no accessory muscle use. 


HEART: 3/6 holosystolic murmur, S1, S2 without murmur, rub or gallop.


ABDOMEN: Obese, Soft, nontender, nondistended, normoactive bowel sounds, no 

guarding, no rebound, no hepatosplenomegaly, no masses.


EXTREMITIES: 2+ pulses, warm, well-perfused, no edema. Surgical scars noted on 

R arm representative of previous graft/fistula. large stage 4 Sacral decubitus 

- wound vac in place draining serosanguinous fluid


NEUROLOGICAL: Cranial nerves II through XII grossly intact. Lip smacking 

observed


PSYCH: Normal mood, normal affect.


SKIN: Warm, dry, normal turgor, no rashes or lesions noted














 Laboratory Results - last 24 hr











  02/03/18 02/04/18 02/05/18





  08:54 14:50 07:00


 


WBC   


 


RBC   


 


Hgb   


 


Hct   


 


MCV   


 


MCH   


 


MCHC   


 


RDW   


 


Plt Count   


 


MPV   


 


Sodium   


 


Potassium   


 


Chloride   


 


Carbon Dioxide   


 


Anion Gap   


 


BUN   


 


Creatinine   


 


POC Glucometer   


 


Random Glucose   


 


Calcium   


 


Phosphorus   


 


Magnesium   


 


Levetiracetam   30.5 


 


Hep A IgM Ab Confirm    Negative


 


Hepatitis A Ab Total    Positive H


 


Hep Bs Antigen    Negative


 


Hep Bs Antibody    Reactive


 


Hep B Core Total Ab    Positive H


 


Blood Type  O POSITIVE  


 


Antibody Screen  Negative  


 


Crossmatch  See Detail  














  02/06/18 02/06/18 02/07/18





  16:31 22:18 05:24


 


WBC   


 


RBC   


 


Hgb   


 


Hct   


 


MCV   


 


MCH   


 


MCHC   


 


RDW   


 


Plt Count   


 


MPV   


 


Sodium   


 


Potassium   


 


Chloride   


 


Carbon Dioxide   


 


Anion Gap   


 


BUN   


 


Creatinine   


 


POC Glucometer  123  170  148


 


Random Glucose   


 


Calcium   


 


Phosphorus   


 


Magnesium   


 


Levetiracetam   


 


Hep A IgM Ab Confirm   


 


Hepatitis A Ab Total   


 


Hep Bs Antigen   


 


Hep Bs Antibody   


 


Hep B Core Total Ab   


 


Blood Type   


 


Antibody Screen   


 


Crossmatch   














  02/07/18 02/07/18 02/07/18





  06:50 06:50 06:50


 


WBC    11.2 H


 


RBC    3.05 L


 


Hgb    8.4 L D


 


Hct    27.0 L


 


MCV    88.5


 


MCH    27.6


 


MCHC    31.1 L


 


RDW    17.6 H


 


Plt Count    514 H


 


MPV    6.9 L


 


Sodium   139 


 


Potassium   3.3 L 


 


Chloride   98 


 


Carbon Dioxide   29 


 


Anion Gap   12 


 


BUN   42 H 


 


Creatinine   5.1 H 


 


POC Glucometer   


 


Random Glucose   158 H 


 


Calcium   9.5 


 


Phosphorus  Cancelled  3.4 


 


Magnesium  Cancelled  2.5 H 


 


Levetiracetam   


 


Hep A IgM Ab Confirm   


 


Hepatitis A Ab Total   


 


Hep Bs Antigen   


 


Hep Bs Antibody   


 


Hep B Core Total Ab   


 


Blood Type   


 


Antibody Screen   


 


Crossmatch   














  02/07/18





  12:33


 


WBC 


 


RBC 


 


Hgb 


 


Hct 


 


MCV 


 


MCH 


 


MCHC 


 


RDW 


 


Plt Count 


 


MPV 


 


Sodium 


 


Potassium 


 


Chloride 


 


Carbon Dioxide 


 


Anion Gap 


 


BUN 


 


Creatinine 


 


POC Glucometer  136


 


Random Glucose 


 


Calcium 


 


Phosphorus 


 


Magnesium 


 


Levetiracetam 


 


Hep A IgM Ab Confirm 


 


Hepatitis A Ab Total 


 


Hep Bs Antigen 


 


Hep Bs Antibody 


 


Hep B Core Total Ab 


 


Blood Type 


 


Antibody Screen 


 


Crossmatch 








Active Medications











Generic Name Dose Route Start Last Admin





  Trade Name Freq  PRN Reason Stop Dose Admin


 


Acetaminophen  650 mg  02/02/18 19:22  02/07/18 12:11





  Tylenol -  PO   650 mg





  Q4H PRN   Administration





  FEVER   


 


Apixaban  5 mg  02/03/18 10:00  02/07/18 12:03





  Eliquis -  PO   5 mg





  BID KEELY   Administration


 


Artificial Tears  1 drop  02/03/18 10:00  02/07/18 12:02





  Artificial Tears  OD   1 drop





  BID KEELY   Administration


 


Ceftriaxone Sodium 1 gm/  50 mls @ 100 mls/hr  02/07/18 12:30  





  Dextrose  IVPB   





  Q24H KEELY   


 


Metronidazole  250 mg in 50 mls @ 50 mls/hr  02/07/18 12:30  





  Flagyl 250mg Premixed Ivpb -  IVPB   





  Q8H-IV KEELY   


 


Insulin Aspart  1 vial  02/03/18 07:00  02/07/18 13:58





  Novolog Vial Sliding Scale -  SQ   Not Given





  TIDAC [facility]   





  Protocol   


 


Ipratropium Bromide  1 amp  02/02/18 23:00  





  Atrovent 0.02% Nebulizer -  NEB   





  Q4H PRN   





  SHORTNESS OF BREATH   


 


Lactobacillus Acidophilus  1 tab  02/03/18 10:00  02/07/18 12:03





  Bacid -  GT   1 tab





  BID KEELY   Administration


 


Levetiracetam  500 mg  02/02/18 23:00  02/07/18 12:03





  Keppra Oral Solution -  GT   500 mg





  BID KEELY   Administration


 


Multivitamins  1 each  02/03/18 10:00  02/07/18 12:03





  Total B With C -  PO   1 each





  DAILY KEELY   Administration


 


Nystatin  1 applic  02/03/18 10:00  02/06/18 12:02





  Nystop Powder -  TP   1 applic





  DAILY KEELY   Administration


 


Paricalcitol  2 mcg  02/05/18 10:00  02/07/18 12:04





  Zemplar -  IVPUSH   Not Given





  MoWeFr@1000 [facility]   











ASSESSMENT/PLAN:





62 year old female with a hx of sacral decubitus, tardive diskinesia, ESRD on HD

, HTN, SVC syndrome/clot, iron-deficiency anemia, Type 2 diabetes, GERD, and 

epilepsy is admitted to the hospital for sepsis 2/2 sacral decubitus





#Sepsis 2/2 sacral ulcer: improving


-discuss with ID about needing ceftriaxone/metronidazole - likely not need - 

also discuss need for MRI


-appreciate ID consultation


-blood cultures negative


-wound cultures grew multiple organisms, await sensitivities


-lactic acid normalized


-wound vac in place - Brittany sent out for SNF to take care of wound vac T/Thu/Sat


-tylenol 650 PRN pain


-likely need MRI to rule out osteomyelitis





#Tardive Dyskinesia: stable, was due to antipsychotic use - stopped 6 months ago


-appreciate neuro consultation





#ESRD: stable, on HD


-s/p HD today - monitor H&H


-appreciate nephrology recommendations





#Anemia: stable


-transfuse as necessary if hgb < 7





#Seizure Disorder: stable


-continue keppra 500 BID





#SVC syndrome/clot: stable


-continue eliquis 5mg BID





#FEN


-tube feed nepro, appreciate speech/swallow reccs


-careful with repletion


-no standing fluids





#Prophylaxis


-On eliquis





#Disposition:


-possible DC to SNF





Visit type





- Emergency Visit


Emergency Visit: No





- New Patient


This patient is new to me today: No





- Critical Care


Critical Care patient: No

## 2018-02-08 LAB
ANION GAP SERPL CALC-SCNC: 10 MMOL/L (ref 8–16)
BUN SERPL-MCNC: 29 MG/DL (ref 7–18)
CALCIUM SERPL-MCNC: 9.9 MG/DL (ref 8.5–10.1)
CHLORIDE SERPL-SCNC: 99 MMOL/L (ref 98–107)
CO2 SERPL-SCNC: 32 MMOL/L (ref 21–32)
CREAT SERPL-MCNC: 3.7 MG/DL (ref 0.55–1.02)
DEPRECATED RDW RBC AUTO: 18.1 % (ref 11.6–15.6)
GLUCOSE SERPL-MCNC: 161 MG/DL (ref 74–106)
HCT VFR BLD CALC: 27.6 % (ref 32.4–45.2)
HGB BLD-MCNC: 8.5 GM/DL (ref 10.7–15.3)
MCH RBC QN AUTO: 27.8 PG (ref 25.7–33.7)
MCHC RBC AUTO-ENTMCNC: 30.9 G/DL (ref 32–36)
MCV RBC: 90.1 FL (ref 80–96)
PLATELET # BLD AUTO: 512 K/MM3 (ref 134–434)
PMV BLD: 6.8 FL (ref 7.5–11.1)
POTASSIUM SERPLBLD-SCNC: 3.1 MMOL/L (ref 3.5–5.1)
RBC # BLD AUTO: 3.07 M/MM3 (ref 3.6–5.2)
SODIUM SERPL-SCNC: 141 MMOL/L (ref 136–145)
WBC # BLD AUTO: 11.2 K/MM3 (ref 4–10)

## 2018-02-08 RX ADMIN — APIXABAN SCH MG: 5 TABLET, FILM COATED ORAL at 22:09

## 2018-02-08 RX ADMIN — INSULIN ASPART SCH: 100 INJECTION, SOLUTION INTRAVENOUS; SUBCUTANEOUS at 16:33

## 2018-02-08 RX ADMIN — ACETAMINOPHEN PRN MG: 325 TABLET ORAL at 18:32

## 2018-02-08 RX ADMIN — ACETAMINOPHEN PRN MG: 325 TABLET ORAL at 13:12

## 2018-02-08 RX ADMIN — LEVETIRACETAM SCH MG: 100 SOLUTION ORAL at 22:09

## 2018-02-08 RX ADMIN — INSULIN ASPART SCH UNITS: 100 INJECTION, SOLUTION INTRAVENOUS; SUBCUTANEOUS at 06:55

## 2018-02-08 RX ADMIN — POLYVINYL ALCOHOL SCH DROP: 14 SOLUTION/ DROPS OPHTHALMIC at 22:09

## 2018-02-08 RX ADMIN — NYSTATIN SCH APPLIC: 100000 POWDER TOPICAL at 09:08

## 2018-02-08 RX ADMIN — APIXABAN SCH MG: 5 TABLET, FILM COATED ORAL at 09:09

## 2018-02-08 RX ADMIN — INSULIN ASPART SCH UNITS: 100 INJECTION, SOLUTION INTRAVENOUS; SUBCUTANEOUS at 11:03

## 2018-02-08 RX ADMIN — VITAMIN C SCH EACH: TAB at 09:08

## 2018-02-08 RX ADMIN — Medication SCH TAB: at 09:08

## 2018-02-08 RX ADMIN — POLYVINYL ALCOHOL SCH DROP: 14 SOLUTION/ DROPS OPHTHALMIC at 09:09

## 2018-02-08 RX ADMIN — Medication SCH TAB: at 22:09

## 2018-02-08 RX ADMIN — LEVETIRACETAM SCH MG: 100 SOLUTION ORAL at 09:09

## 2018-02-08 NOTE — PN
Physical Exam: 


SUBJECTIVE: Patient seen and examined at bedside. Patient is awake and alert. 

States that she has some mild pain on her sacrum. Denies fevers, chills, nausea

, vomiting, diarrhea.








OBJECTIVE:





 Vital Signs











 Period  Temp  Pulse  Resp  BP Sys/Brown  Pulse Ox


 


 Last 24 Hr  98.1 F-100.1 F  85-93  20-20  102-123/60-64  











GENERAL: The patient is awake, alert, and fully oriented, in no acute distress.


HEAD: Normal with no signs of trauma.


LUNGS: Breath sounds equal, clear to auscultation bilaterally, no wheezes, no 

crackles, no accessory muscle use. 


HEART: 3/6 holosystolic murmur, S1, S2 without murmur, rub or gallop.


ABDOMEN: Obese, Soft, nontender, nondistended, normoactive bowel sounds, no 

guarding, no rebound, no hepatosplenomegaly, no masses.


EXTREMITIES: 2+ pulses, warm, well-perfused, no edema. Surgical scars noted on 

R arm representative of previous graft/fistula. large stage 4 Sacral decubitus 

- wound vac in place draining serosanguinous fluid


NEUROLOGICAL: Cranial nerves II through XII grossly intact. Lip smacking 

observed


PSYCH: Normal mood, normal affect.


SKIN: Warm, dry, normal turgor, no rashes or lesions noted














 Laboratory Results - last 24 hr











  02/03/18 02/08/18 02/08/18





  11:10 06:53 07:55


 


WBC    11.2 H


 


RBC    3.07 L


 


Hgb    8.5 L


 


Hct    27.6 L


 


MCV    90.1


 


MCH    27.8


 


MCHC    30.9 L


 


RDW    18.1 H


 


Plt Count    512 H


 


MPV    6.8 L


 


Sodium   


 


Potassium   


 


Chloride   


 


Carbon Dioxide   


 


Anion Gap   


 


BUN   


 


Creatinine   


 


POC Glucometer   171 


 


Random Glucose   


 


Calcium   


 


C-Reactive Protein   


 


Blood Type  O POSITIVE  


 


Crossmatch  See Detail  














  02/08/18 02/08/18 02/08/18





  07:55 07:55 11:01


 


WBC   


 


RBC   


 


Hgb   


 


Hct   


 


MCV   


 


MCH   


 


MCHC   


 


RDW   


 


Plt Count   


 


MPV   


 


Sodium  141  


 


Potassium  3.1 L  


 


Chloride  99  


 


Carbon Dioxide  32  


 


Anion Gap  10  


 


BUN  29 H  


 


Creatinine  3.7 H  


 


POC Glucometer    189


 


Random Glucose  161 H  


 


Calcium  9.9  


 


C-Reactive Protein  13.4 H  Cancelled 


 


Blood Type   


 


Crossmatch   














  02/08/18





  16:33


 


WBC 


 


RBC 


 


Hgb 


 


Hct 


 


MCV 


 


MCH 


 


MCHC 


 


RDW 


 


Plt Count 


 


MPV 


 


Sodium 


 


Potassium 


 


Chloride 


 


Carbon Dioxide 


 


Anion Gap 


 


BUN 


 


Creatinine 


 


POC Glucometer  119


 


Random Glucose 


 


Calcium 


 


C-Reactive Protein 


 


Blood Type 


 


Crossmatch 








Active Medications











Generic Name Dose Route Start Last Admin





  Trade Name Preethi  PRN Reason Stop Dose Admin


 


Acetaminophen  650 mg  02/02/18 19:22  02/08/18 13:12





  Tylenol -  PO   650 mg





  Q4H PRN   Administration





  FEVER   


 


Apixaban  5 mg  02/03/18 10:00  02/08/18 09:09





  Eliquis -  PO   5 mg





  BID KEELY   Administration


 


Artificial Tears  1 drop  02/03/18 10:00  02/08/18 09:09





  Artificial Tears  OD   1 drop





  BID KEELY   Administration


 


Epoetin Duglas  10,000 unit  02/09/18 17:13  





  Procrit -  IVPUSH  02/09/18 17:14  





  ONCE ONE   


 


Insulin Aspart  1 vial  02/03/18 07:00  02/08/18 16:33





  Novolog Vial Sliding Scale -  SQ   Not Given





  TIDAC [facility]   





  Protocol   


 


Lactobacillus Acidophilus  1 tab  02/03/18 10:00  02/08/18 09:08





  Bacid -  GT   1 tab





  BID KEELY   Administration


 


Levetiracetam  500 mg  02/02/18 23:00  02/08/18 09:09





  Keppra Oral Solution -  GT   500 mg





  BID KEELY   Administration


 


Multivitamins  1 each  02/03/18 10:00  02/08/18 09:08





  Total B With C -  PO   1 each





  DAILY KEELY   Administration


 


Nystatin  1 applic  02/03/18 10:00  02/08/18 09:08





  Nystop Powder -  TP   1 applic





  DAILY KEELY   Administration


 


Paricalcitol  2 mcg  02/05/18 10:00  02/07/18 12:04





  Zemplar -  IVPUSH   Not Given





  MoWeFr@1000 [facility]   











ASSESSMENT/PLAN:





62 year old female with a hx of sacral decubitus, tardive diskinesia, ESRD on HD

, HTN, SVC syndrome/clot, iron-deficiency anemia, Type 2 diabetes, GERD, and 

epilepsy is admitted to the hospital for sepsis 2/2 sacral decubitus





#Sepsis 2/2 sacral ulcer: stable


-discuss with ID about needing ceftriaxone/metronidazole - continue with MRI


-appreciate ID consultation


-blood cultures negative


-wound cultures grew multiple organisms


-lactic acid normalized


-wound vac in place - patient likely returning to [facility]


-tylenol 650 PRN pain





#Tardive Dyskinesia: stable, was due to antipsychotic use - stopped 6 months ago


-appreciate neuro consultation





#ESRD: stable, on HD


-HD tomorrow


-epogen for anemia per nephro


-appreciate nephrology recommendations





#Anemia: stable


-transfuse as necessary if hgb < 7





#Seizure Disorder: stable


-continue keppra 500 BID





#SVC syndrome/clot: stable


-continue eliquis 5mg BID





#FEN


-tube feed nepro, appreciate speech/swallow reccs


-careful with repletion


-no standing fluids





#Prophylaxis


-On eliquis





#Disposition:


-possible DC to SNF in AM





Visit type





- Emergency Visit


Emergency Visit: No





- New Patient


This patient is new to me today: No





- Critical Care


Critical Care patient: No

## 2018-02-08 NOTE — PN
Progress Note (short form)





- Note


Progress Note: 





ID











Ceftriaxone and metronidazole yet no clear idea of what we are treating at this 

point





Take note of low grade temps slightly elevated WBC and platelets yet no clear 

source of infection though sacrum suspect  


 Selected Entries











  02/08/18 02/08/18





  01:52 05:54


 


Temperature 100.1 F H 98.7 F


 


Pulse Rate  90


 


Respiratory  20





Rate  


 


Blood Pressure  123/61








Microbiology





02/03/18 03:30   Wound   Gram Stain - Final


02/03/18 03:30   Wound   Wound Culture - Final


                              Escherichia Coli


                              Morganella Morganii


                              Strep Agalactiae Group B


                              Beta Hem Streptococcus Group G


                              Diphtheroid/Corynebacterium


02/02/18 14:39   Blood - Peripheral Venous   Blood Culture - Final


                              NO GROWTH AFTER 5 DAYS INCUBATION


02/02/18 13:25   Blood - Peripheral Venous   Blood Culture - Final


                              NO GROWTH AFTER 5 DAYS INCUBATION





 Laboratory Tests











  02/08/18





  07:55


 


WBC  11.2 H


 


Hgb  8.5 L


 


Hct  27.6 L


 


Plt Count  512 H








Assessment Polymicrobial wound culture as expected !! NO clear source of 

infection





Plan               Would stop antibiotics at this point/  Obtain CRP and ESR  

CT scan abd pelvis with sacral attention


Derrick THOMPSON





Problem List





- Problems


(1) ESRD (end stage renal disease)


Code(s): N18.6 - END STAGE RENAL DISEASE   





(2) Sepsis


Code(s): A41.9 - SEPSIS, UNSPECIFIED ORGANISM   





(3) Decubital ulcer


Code(s): L89.90 - PRESSURE ULCER OF UNSPECIFIED SITE, UNSPECIFIED STAGE

## 2018-02-08 NOTE — PN
Progress Note, Physician


History of Present Illness: 





Pt seen and examined at bedside. She is awake and appears comfortable. 





- Current Medication List


Current Medications: 


Active Medications





Acetaminophen (Tylenol -)  650 mg PO Q4H PRN


   PRN Reason: FEVER


   Last Admin: 02/08/18 13:12 Dose:  650 mg


Apixaban (Eliquis -)  5 mg PO BID [facility]


   Last Admin: 02/08/18 09:09 Dose:  5 mg


Artificial Tears (Artificial Tears)  1 drop OD BID [facility]


   Last Admin: 02/08/18 09:09 Dose:  1 drop


Insulin Aspart (Novolog Vial Sliding Scale -)  1 vial SQ TIDAC KEELY


   PRN Reason: Protocol


   Last Admin: 02/08/18 16:33 Dose:  Not Given


Lactobacillus Acidophilus (Bacid -)  1 tab GT BID [facility]


   Last Admin: 02/08/18 09:08 Dose:  1 tab


Levetiracetam (Keppra Oral Solution -)  500 mg GT BID [facility]


   Last Admin: 02/08/18 09:09 Dose:  500 mg


Multivitamins (Total B With C -)  1 each PO DAILY [facility]


   Last Admin: 02/08/18 09:08 Dose:  1 each


Nystatin (Nystop Powder -)  1 applic TP DAILY [facility]


   Last Admin: 02/08/18 09:08 Dose:  1 applic


Paricalcitol (Zemplar -)  2 mcg IVPUSH MoWeFr@1000 [facility]


   Last Admin: 02/07/18 12:04 Dose:  Not Given











- Objective


Vital Signs: 


 Vital Signs











Temperature  98.1 F   02/08/18 09:00


 


Pulse Rate  85   02/08/18 09:00


 


Respiratory Rate  20   02/08/18 09:00


 


Blood Pressure  102/64   02/08/18 09:00


 


O2 Sat by Pulse Oximetry (%)  98   02/08/18 09:00











Constitutional: Yes: Calm


Eyes: Yes: Conjunctiva Clear


Cardiovascular: Yes: S1, S2


Respiratory: Yes: CTA Bilaterally


Gastrointestinal: Yes: Abdomen, Obese, Other (peg)


Genitourinary: Yes: Incontinence


Musculoskeletal: Yes: Muscle Weakness


Edema: Yes


Edema: LLE: Trace, RLE: Trace


Neurological: Yes: Pre-Existing Deficit


Labs: 


 CBC, BMP





 02/08/18 07:55 





 02/08/18 07:55 





 INR, PTT











INR  1.42  (0.82-1.09)  H  02/03/18  06:10    














Problem List





- Problems


(1) Decubital ulcer


Code(s): L89.90 - PRESSURE ULCER OF UNSPECIFIED SITE, UNSPECIFIED STAGE   





(2) ESRD (end stage renal disease)


Code(s): N18.6 - END STAGE RENAL DISEASE   





(3) Sepsis


Code(s): A41.9 - SEPSIS, UNSPECIFIED ORGANISM   





Assessment/Plan


 Current Medications











Generic Name Dose Route Start Last Admin





  Trade Name Freq  PRN Reason Stop Dose Admin


 


Acetaminophen  650 mg  02/02/18 19:22  02/08/18 13:12





  Tylenol -  PO   650 mg





  Q4H PRN   Administration





  FEVER   


 


Apixaban  5 mg  02/03/18 10:00  02/08/18 09:09





  Eliquis -  PO   5 mg





  BID KEELY   Administration


 


Artificial Tears  1 drop  02/03/18 10:00  02/08/18 09:09





  Artificial Tears  OD   1 drop





  BID KEELY   Administration


 


Insulin Aspart  1 vial  02/03/18 07:00  02/08/18 16:33





  Novolog Vial Sliding Scale -  SQ   Not Given





  TIDAC [facility]   





  Protocol   


 


Lactobacillus Acidophilus  1 tab  02/03/18 10:00  02/08/18 09:08





  Bacid -  GT   1 tab





  BID KEELY   Administration


 


Levetiracetam  500 mg  02/02/18 23:00  02/08/18 09:09





  Keppra Oral Solution -  GT   500 mg





  BID KEELY   Administration


 


Multivitamins  1 each  02/03/18 10:00  02/08/18 09:08





  Total B With C -  PO   1 each





  DAILY KEELY   Administration


 


Nystatin  1 applic  02/03/18 10:00  02/08/18 09:08





  Nystop Powder -  TP   1 applic





  DAILY KEELY   Administration


 


Paricalcitol  2 mcg  02/05/18 10:00  02/07/18 12:04





  Zemplar -  IVPUSH   Not Given





  MoWeFr@1000 KE[facility]   

















Impression


1. ESRD


2. anemia


3. change in mental status


4. epilepsy


5. htn


6. dm


7. gerd


8. obesity





Plan


- HD in am


- epogen for anemia


- monitor hg


- cont current management


- renal dose abx


- will follow


Dr Young

## 2018-02-08 NOTE — PN
Teaching Attending Note


Name of Resident: Donta Butterfield





ATTENDING PHYSICIAN STATEMENT





I saw and evaluated the patient.


I reviewed the resident's note and discussed the case with the resident.


I agree with the resident's findings and plan as documented.








SUBJECTIVE:


No fever or chills. has no pain. no complaints 





OBJECTIVE:


NAD, R mouth angle pulled to R . no other asymmetry . 


Cv: RRR


Lungs: decreased breath sounds b/l bases 


Abd:  soft, Nt, ND , NL BS 


Ext: 1 + edema 





A/P 





Unfortunate 61 y/o lady with h/o TD , ESRD, VC syndrome , iron def anemia , 

sacral decub, Dm II , and Ht and other medical problems who presented from 

wound care with unusual facial movements . 





1- Sacral decub ulcer. possible infection 


- ceftriaxon and flagyl


- MRI to r/o OM .





2- ESRD: cont HD per renal 





3- TD: chronic . monitor 


off her antiphsycotics 





4- h/o seizure , cont keppra 





5- SVc syndrome : cont eliquis 











HLOC pending MRI , after that SNF

## 2018-02-08 NOTE — PN
Progress Note, SLP





- Note


Progress Note: 





Dys puree/thin liquid initiated. TF/PO feeding adjusted and coordinated by RD.


 Selected Entries











  02/07/18 02/07/18 02/07/18





  01:58 04:57 06:45


 


Breakfast   


 


Temperature 98.7 F 99.6 F 98.1 F














  02/07/18 02/07/18 02/07/18





  11:30 13:24 16:10


 


Breakfast   


 


Temperature 98.9 F 98.5 F 98.2 F














  02/07/18 02/08/18 02/08/18





  22:30 01:52 05:54


 


Breakfast   


 


Temperature 99.7 F H 100.1 F H 98.7 F














  02/08/18 02/08/18





  09:00 10:11


 


Breakfast  75%


 


Temperature 98.1 F 








 Laboratory Tests











  02/06/18 02/07/18 02/08/18





  06:45 06:50 07:55


 


WBC  11.2 H  11.2 H  11.2 H











Tolerating PO diet initiated today.


Monitor nutritional and puilmonary status

## 2018-02-09 LAB
ANION GAP SERPL CALC-SCNC: 12 MMOL/L (ref 8–16)
BUN SERPL-MCNC: 47 MG/DL (ref 7–18)
CALCIUM SERPL-MCNC: 10.2 MG/DL (ref 8.5–10.1)
CHLORIDE SERPL-SCNC: 98 MMOL/L (ref 98–107)
CO2 SERPL-SCNC: 30 MMOL/L (ref 21–32)
CREAT SERPL-MCNC: 5.1 MG/DL (ref 0.55–1.02)
DEPRECATED RDW RBC AUTO: 17.9 % (ref 11.6–15.6)
GLUCOSE SERPL-MCNC: 165 MG/DL (ref 74–106)
HCT VFR BLD CALC: 27.2 % (ref 32.4–45.2)
HGB BLD-MCNC: 8.4 GM/DL (ref 10.7–15.3)
MCH RBC QN AUTO: 28.1 PG (ref 25.7–33.7)
MCHC RBC AUTO-ENTMCNC: 31 G/DL (ref 32–36)
MCV RBC: 90.6 FL (ref 80–96)
PLATELET # BLD AUTO: 513 K/MM3 (ref 134–434)
PMV BLD: 7 FL (ref 7.5–11.1)
POTASSIUM SERPLBLD-SCNC: 3.6 MMOL/L (ref 3.5–5.1)
RBC # BLD AUTO: 3 M/MM3 (ref 3.6–5.2)
SODIUM SERPL-SCNC: 140 MMOL/L (ref 136–145)
WBC # BLD AUTO: 11.3 K/MM3 (ref 4–10)

## 2018-02-09 RX ADMIN — NYSTATIN SCH APPLIC: 100000 POWDER TOPICAL at 09:42

## 2018-02-09 RX ADMIN — LEVETIRACETAM SCH MG: 100 SOLUTION ORAL at 22:13

## 2018-02-09 RX ADMIN — Medication SCH TAB: at 09:42

## 2018-02-09 RX ADMIN — POLYVINYL ALCOHOL SCH DROP: 14 SOLUTION/ DROPS OPHTHALMIC at 22:18

## 2018-02-09 RX ADMIN — INSULIN ASPART SCH: 100 INJECTION, SOLUTION INTRAVENOUS; SUBCUTANEOUS at 16:22

## 2018-02-09 RX ADMIN — Medication SCH ML: at 18:19

## 2018-02-09 RX ADMIN — POLYVINYL ALCOHOL SCH DROP: 14 SOLUTION/ DROPS OPHTHALMIC at 09:42

## 2018-02-09 RX ADMIN — INSULIN ASPART SCH UNITS: 100 INJECTION, SOLUTION INTRAVENOUS; SUBCUTANEOUS at 06:02

## 2018-02-09 RX ADMIN — APIXABAN SCH MG: 5 TABLET, FILM COATED ORAL at 22:13

## 2018-02-09 RX ADMIN — LEVETIRACETAM SCH MG: 100 SOLUTION ORAL at 09:42

## 2018-02-09 RX ADMIN — PARICALCITOL SCH: 5 INJECTION, SOLUTION INTRAVENOUS at 17:00

## 2018-02-09 RX ADMIN — INSULIN ASPART SCH: 100 INJECTION, SOLUTION INTRAVENOUS; SUBCUTANEOUS at 11:08

## 2018-02-09 RX ADMIN — VITAMIN C SCH EACH: TAB at 09:42

## 2018-02-09 RX ADMIN — APIXABAN SCH MG: 5 TABLET, FILM COATED ORAL at 09:42

## 2018-02-09 RX ADMIN — Medication SCH TAB: at 22:13

## 2018-02-09 RX ADMIN — ACETAMINOPHEN PRN MG: 325 TABLET ORAL at 14:25

## 2018-02-09 NOTE — PN
Progress Note (short form)





- Note


Progress Note: 





awake and alert





no complaints





vac on sacrum - placed yesterday





 Vital Signs











 Period  Temp  Pulse  Resp  BP Sys/Brown  Pulse Ox


 


 Last 24 Hr  97.9 F-99.4 F  86-95  18-20  100-127/50-69  





facial grimacing


cor-rrr


llungs clear


abd soft,nt +GT


ext no edema 








 CBC, BMP





 02/09/18 06:30 





 02/09/18 06:30 





 Laboratory Tests











  02/08/18





  07:55


 


C-Reactive Protein  13.4 H








 Microbiology





02/02/18 14:39   Blood - Peripheral Venous   Blood Culture - Final


                            NO GROWTH AFTER 5 DAYS INCUBATION


02/02/18 13:25   Blood - Peripheral Venous   Blood Culture - Final


                            NO GROWTH AFTER 5 DAYS INCUBATION


02/03/18 03:30   Wound   Gram Stain - Final


02/03/18 03:30   Wound   Wound Culture - Final


                            Escherichia Coli


                            Morganella Morganii


                            Strep Agalactiae Group B


                            Beta Hem Streptococcus Group G


                            Diphtheroid/Corynebacterium





a/p


workup for sacral osteo in progress- imaging pending


currently off antibiotics

## 2018-02-09 NOTE — PN
Physical Exam: 


SUBJECTIVE: Patient seen and examined at bedside. No overnight events, patient 

is scheduled for HD today.








OBJECTIVE:





 Vital Signs











 Period  Temp  Pulse  Resp  BP Sys/Brown  Pulse Ox


 


 Last 24 Hr  97.9 F-99.4 F  86-95  18-20  /50-69  











GENERAL: The patient is awake, alert, and fully oriented, in no acute distress.


HEAD: Normal with no signs of trauma.


LUNGS: Breath sounds equal, clear to auscultation bilaterally, no wheezes, no 

crackles, no accessory muscle use. 


HEART: 3/6 holosystolic murmur, S1, S2 without murmur, rub or gallop.


ABDOMEN: Obese, Soft, nontender, nondistended, normoactive bowel sounds, no 

guarding, no rebound, no hepatosplenomegaly, no masses.


EXTREMITIES: 2+ pulses, warm, well-perfused, no edema. Surgical scars noted on 

R arm representative of previous graft/fistula. large stage 4 Sacral decubitus 

- wound vac in place


NEUROLOGICAL: Cranial nerves II through XII grossly intact. Lip smacking 

observed


PSYCH: Normal mood, normal affect.


SKIN: Warm, dry, normal turgor, no rashes or lesions noted














 Laboratory Results - last 24 hr











  02/08/18 02/08/18 02/09/18





  16:33 22:05 05:55


 


WBC   


 


RBC   


 


Hgb   


 


Hct   


 


MCV   


 


MCH   


 


MCHC   


 


RDW   


 


Plt Count   


 


MPV   


 


Sodium   


 


Potassium   


 


Chloride   


 


Carbon Dioxide   


 


Anion Gap   


 


BUN   


 


Creatinine   


 


POC Glucometer  119  173  191


 


Random Glucose   


 


Calcium   














  02/09/18 02/09/18 02/09/18





  06:30 06:30 11:07


 


WBC  11.3 H  


 


RBC  3.00 L  


 


Hgb  8.4 L  


 


Hct  27.2 L  


 


MCV  90.6  


 


MCH  28.1  


 


MCHC  31.0 L  


 


RDW  17.9 H  


 


Plt Count  513 H  


 


MPV  7.0 L  


 


Sodium   140 


 


Potassium   3.6 


 


Chloride   98 


 


Carbon Dioxide   30 


 


Anion Gap   12 


 


BUN   47 H 


 


Creatinine   5.1 H 


 


POC Glucometer    132


 


Random Glucose   165 H 


 


Calcium   10.2 H 








Active Medications











Generic Name Dose Route Start Last Admin





  Trade Name Freq  PRN Reason Stop Dose Admin


 


Acetaminophen  650 mg  02/02/18 19:22  02/09/18 14:25





  Tylenol -  PO   650 mg





  Q4H PRN   Administration





  FEVER   


 


Apixaban  5 mg  02/03/18 10:00  02/09/18 09:42





  Eliquis -  PO   5 mg





  BID KEELY   Administration


 


Artificial Tears  1 drop  02/03/18 10:00  02/09/18 09:42





  Artificial Tears  OD   1 drop





  BID KEELY   Administration


 


Epoetin Duglas  10,000 unit  02/09/18 17:13  





  Procrit -  IVPUSH  02/09/18 17:14  





  ONCE ONE   


 


Insulin Aspart  1 vial  02/03/18 07:00  02/09/18 11:08





  Novolog Vial Sliding Scale -  SQ   Not Given





  TIDAC [facility]   





  Protocol   


 


Lactobacillus Acidophilus  1 tab  02/03/18 10:00  02/09/18 09:42





  Bacid -  GT   1 tab





  BID KEELY   Administration


 


Levetiracetam  500 mg  02/02/18 23:00  02/09/18 09:42





  Keppra Oral Solution -  GT   500 mg





  BID KEELY   Administration


 


Multivitamins  1 each  02/03/18 10:00  02/09/18 09:42





  Total B With C -  PO   1 each





  DAILY KEELY   Administration


 


Nystatin  1 applic  02/03/18 10:00  02/09/18 09:42





  Nystop Powder -  TP   1 applic





  DAILY KEELY   Administration


 


Paricalcitol  2 mcg  02/05/18 10:00  02/07/18 12:04





  Zemplar -  IVPUSH   Not Given





  [facility]@1000 [facility]   











ASSESSMENT/PLAN:





62 year old female with a hx of sacral decubitus, tardive diskinesia, ESRD on HD

, HTN, SVC syndrome/clot, iron-deficiency anemia, Type 2 diabetes, GERD, and 

epilepsy is admitted to the hospital for sepsis 2/2 sacral decubitus





#Sepsis 2/2 sacral ulcer: stable


-no current abx


-WAITING ON MRI SACR[facility]


-appreciate ID consultation


-blood cultures negative


-wound cultures grew multiple organisms


-lactic acid normalized


-wound vac in place - patient likely returning to [facility]


-tylenol 650 PRN pain





#Tardive Dyskinesia: stable, was due to antipsychotic use - stopped 6 months ago


-appreciate neuro consultation





#ESRD: stable, on HD


-HD today


-appreciate nephrology recommendations





#Anemia: stable


-transfuse as necessary if hgb < 7





#Seizure Disorder: stable


-continue keppra 500 BID





#SVC syndrome/clot: stable


-continue eliquis 5mg BID





#FEN


-tube feed nepro, appreciate speech/swallow reccs (6pm-6am - 12 hours)


-dysphagia puree


-careful with repletion


-no standing fluids





#Prophylaxis


-On eliquis





#Disposition:


-possible DC to SNF in AM pending MRI results





Visit type





- Emergency Visit


Emergency Visit: No





- New Patient


This patient is new to me today: No





- Critical Care


Critical Care patient: No

## 2018-02-09 NOTE — PN
Progress Note, SLP





- Note


Progress Note: 





 Selected Entries











  02/08/18 02/08/18 02/08/18





  01:52 05:54 09:00


 


Breakfast   


 


Supper   


 


Temperature 100.1 F H 98.7 F 98.1 F














  02/08/18 02/08/18 02/08/18





  10:11 18:00 18:35


 


Breakfast 75%  


 


Supper   50%


 


Temperature  97.9 F 














  02/08/18 02/09/18 02/09/18





  22:00 02:05 06:23


 


Breakfast   


 


Supper   


 


Temperature 99.4 F 98.3 F 99.2 F














  02/09/18 02/09/18





  09:00 09:49


 


Breakfast  50%


 


Supper  


 


Temperature 98.3 F 








Tolerating PO diet with GT feeding nocturnally for needed nutritional support.

## 2018-02-09 NOTE — PN
Progress Note, Physician


History of Present Illness: 





Pt seen and examined at bedside. She is tolerating HD. He denies shortness of 

breath. 





- Current Medication List


Current Medications: 


Active Medications





Acetaminophen (Tylenol -)  650 mg PO Q4H PRN


   PRN Reason: FEVER


   Last Admin: 02/09/18 14:25 Dose:  650 mg


Amino Acids (Prosource No Carb Liquid Pkt)  30 ml PO BID@0800,1730 [facility]


Apixaban (Eliquis -)  5 mg PO BID [facility]


   Last Admin: 02/09/18 09:42 Dose:  5 mg


Artificial Tears (Artificial Tears)  1 drop OD BID KEELY


   Last Admin: 02/09/18 09:42 Dose:  1 drop


Epoetin Duglas (Procrit -)  10,000 unit IVPUSH ONCE ONE


   Stop: 02/09/18 17:14


Insulin Aspart (Novolog Vial Sliding Scale -)  1 vial SQ TIDAC [facility]


   PRN Reason: Protocol


   Last Admin: 02/09/18 16:22 Dose:  Not Given


Lactobacillus Acidophilus (Bacid -)  1 tab GT BID [facility]


   Last Admin: 02/09/18 09:42 Dose:  1 tab


Levetiracetam (Keppra Oral Solution -)  500 mg GT BID [facility]


   Last Admin: 02/09/18 09:42 Dose:  500 mg


Multivitamins (Total B With C -)  1 each PO DAILY [facility]


   Last Admin: 02/09/18 09:42 Dose:  1 each


Nystatin (Nystop Powder -)  1 applic TP DAILY [facility]


   Last Admin: 02/09/18 09:42 Dose:  1 applic


Paricalcitol (Zemplar -)  2 mcg IVPUSH [facility]@1000 [facility]


   Last Admin: 02/07/18 12:04 Dose:  Not Given











- Objective


Vital Signs: 


 Vital Signs











Temperature  98.2 F   02/09/18 13:24


 


Pulse Rate  86   02/09/18 15:30


 


Respiratory Rate  18   02/09/18 15:30


 


Blood Pressure  123/61   02/09/18 15:30


 


O2 Sat by Pulse Oximetry (%)  100   02/09/18 09:00











Constitutional: Yes: Calm


Eyes: Yes: Conjunctiva Clear


HENT: Yes: Atraumatic


Neck: Yes: Supple


Cardiovascular: Yes: S1, S2


Gastrointestinal: Yes: Soft, Other (peg)


Genitourinary: Yes: Incontinence


Edema: Yes


Edema: LLE: Trace, RLE: Trace


Neurological: Yes: Pre-Existing Deficit


Labs: 


 CBC, BMP





 02/09/18 06:30 





 02/09/18 06:30 





 INR, PTT











INR  1.42  (0.82-1.09)  H  02/03/18  06:10    














Problem List





- Problems


(1) Decubital ulcer


Code(s): L89.90 - PRESSURE ULCER OF UNSPECIFIED SITE, UNSPECIFIED STAGE   





(2) ESRD (end stage renal disease)


Code(s): N18.6 - END STAGE RENAL DISEASE   





(3) Sepsis


Code(s): A41.9 - SEPSIS, UNSPECIFIED ORGANISM   





Assessment/Plan


 Current Medications











Generic Name Dose Route Start Last Admin





  Trade Name Freq  PRN Reason Stop Dose Admin


 


Acetaminophen  650 mg  02/02/18 19:22  02/09/18 14:25





  Tylenol -  PO   650 mg





  Q4H PRN   Administration





  FEVER   


 


Amino Acids  30 ml  02/09/18 17:30  





  Prosource No Carb Liquid Pkt  PO   





  BID@0800,1730 KE[facility]   


 


Apixaban  5 mg  02/03/18 10:00  02/09/18 09:42





  Eliquis -  PO   5 mg





  BID KEELY   Administration


 


Artificial Tears  1 drop  02/03/18 10:00  02/09/18 09:42





  Artificial Tears  OD   1 drop





  BID KEELY   Administration


 


Epoetin Duglas  10,000 unit  02/09/18 17:13  





  Procrit -  IVPUSH  02/09/18 17:14  





  ONCE ONE   


 


Insulin Aspart  1 vial  02/03/18 07:00  02/09/18 16:22





  Novolog Vial Sliding Scale -  SQ   Not Given





  TIDAC [facility]   





  Protocol   


 


Lactobacillus Acidophilus  1 tab  02/03/18 10:00  02/09/18 09:42





  Bacid -  GT   1 tab





  BID KEELY   Administration


 


Levetiracetam  500 mg  02/02/18 23:00  02/09/18 09:42





  Keppra Oral Solution -  GT   500 mg





  BID KEELY   Administration


 


Multivitamins  1 each  02/03/18 10:00  02/09/18 09:42





  Total B With C -  PO   1 each





  DAILY KEELY   Administration


 


Nystatin  1 applic  02/03/18 10:00  02/09/18 09:42





  Nystop Powder -  TP   1 applic





  DAILY KEELY   Administration


 


Paricalcitol  2 mcg  02/05/18 10:00  02/07/18 12:04





  Zemplar -  IVPUSH   Not Given





  [facility]@1000 [facility]   














Impression


1. ESRD


2. anemia


3. change in mental status


4. epilepsy


5. htn


6. dm


7. gerd


8. obesity


9. hypercalcemia








Plan


- HD today


- hold zemplar


- check pth


- repeat cmp and check albumin


- epogen for anemia


- monitor hg


- will follow


Dr Young

## 2018-02-10 LAB
ALBUMIN SERPL-MCNC: 2.7 G/DL (ref 3.4–5)
ALP SERPL-CCNC: 144 U/L (ref 45–117)
ALT SERPL-CCNC: 30 U/L (ref 12–78)
ANION GAP SERPL CALC-SCNC: 10 MMOL/L (ref 8–16)
AST SERPL-CCNC: 45 U/L (ref 15–37)
BILIRUB SERPL-MCNC: 0.6 MG/DL (ref 0.2–1)
BUN SERPL-MCNC: 41 MG/DL (ref 7–18)
CALCIUM SERPL-MCNC: 10.4 MG/DL (ref 8.5–10.1)
CHLORIDE SERPL-SCNC: 100 MMOL/L (ref 98–107)
CO2 SERPL-SCNC: 31 MMOL/L (ref 21–32)
CREAT SERPL-MCNC: 4.4 MG/DL (ref 0.55–1.02)
DEPRECATED RDW RBC AUTO: 17.4 % (ref 11.6–15.6)
DEPRECATED RDW RBC AUTO: 18.3 % (ref 11.6–15.6)
GLUCOSE SERPL-MCNC: 151 MG/DL (ref 74–106)
HCT VFR BLD CALC: 29 % (ref 32.4–45.2)
HCT VFR BLD CALC: 29.7 % (ref 32.4–45.2)
HGB BLD-MCNC: 9.1 GM/DL (ref 10.7–15.3)
HGB BLD-MCNC: 9.3 GM/DL (ref 10.7–15.3)
MCH RBC QN AUTO: 27.8 PG (ref 25.7–33.7)
MCH RBC QN AUTO: 28.7 PG (ref 25.7–33.7)
MCHC RBC AUTO-ENTMCNC: 30.7 G/DL (ref 32–36)
MCHC RBC AUTO-ENTMCNC: 32.1 G/DL (ref 32–36)
MCV RBC: 89.6 FL (ref 80–96)
MCV RBC: 90.5 FL (ref 80–96)
PLATELET # BLD AUTO: 564 K/MM3 (ref 134–434)
PLATELET # BLD AUTO: 565 K/MM3 (ref 134–434)
PLATELET BLD QL SMEAR: (no result)
PMV BLD: 7.9 FL (ref 7.5–11.1)
PMV BLD: 7.9 FL (ref 7.5–11.1)
POTASSIUM SERPLBLD-SCNC: 4 MMOL/L (ref 3.5–5.1)
PROT SERPL-MCNC: 7.3 G/DL (ref 6.4–8.2)
RBC # BLD AUTO: 3.23 M/MM3 (ref 3.6–5.2)
RBC # BLD AUTO: 3.28 M/MM3 (ref 3.6–5.2)
SODIUM SERPL-SCNC: 141 MMOL/L (ref 136–145)
WBC # BLD AUTO: 22 K/MM3 (ref 4–10)
WBC # BLD AUTO: 24.1 K/MM3 (ref 4–10)

## 2018-02-10 RX ADMIN — INSULIN ASPART SCH: 100 INJECTION, SOLUTION INTRAVENOUS; SUBCUTANEOUS at 17:56

## 2018-02-10 RX ADMIN — ACETAMINOPHEN PRN MG: 325 TABLET ORAL at 13:13

## 2018-02-10 RX ADMIN — LEVETIRACETAM SCH MG: 100 SOLUTION ORAL at 12:48

## 2018-02-10 RX ADMIN — NYSTATIN SCH APPLIC: 100000 POWDER TOPICAL at 12:49

## 2018-02-10 RX ADMIN — INSULIN ASPART SCH UNITS: 100 INJECTION, SOLUTION INTRAVENOUS; SUBCUTANEOUS at 12:45

## 2018-02-10 RX ADMIN — Medication SCH ML: at 18:39

## 2018-02-10 RX ADMIN — APIXABAN SCH MG: 5 TABLET, FILM COATED ORAL at 12:49

## 2018-02-10 RX ADMIN — Medication SCH TAB: at 12:45

## 2018-02-10 RX ADMIN — Medication SCH TAB: at 22:38

## 2018-02-10 RX ADMIN — Medication SCH ML: at 10:44

## 2018-02-10 RX ADMIN — LEVETIRACETAM SCH MG: 100 SOLUTION ORAL at 22:38

## 2018-02-10 RX ADMIN — VITAMIN C SCH EACH: TAB at 12:45

## 2018-02-10 RX ADMIN — POLYVINYL ALCOHOL SCH DROP: 14 SOLUTION/ DROPS OPHTHALMIC at 12:48

## 2018-02-10 RX ADMIN — APIXABAN SCH MG: 5 TABLET, FILM COATED ORAL at 22:38

## 2018-02-10 RX ADMIN — INSULIN ASPART SCH UNITS: 100 INJECTION, SOLUTION INTRAVENOUS; SUBCUTANEOUS at 06:22

## 2018-02-10 RX ADMIN — POLYVINYL ALCOHOL SCH DROP: 14 SOLUTION/ DROPS OPHTHALMIC at 22:38

## 2018-02-10 NOTE — PN
Progress Note (short form)





- Note


Progress Note: 





awake and alert





no complaints





vac on sacrum - placed yesterday





 


 Vital Signs











 Period  Temp  Pulse  Resp  BP Sys/Brown  Pulse Ox


 


 Last 24 Hr  97.9 F-98.7 F    18-18  /33-72  98








cor-rrr


lungs clear


abd soft,nt


sacral wound stage 3 clean


ext no edema





 CBC, BMP





 02/10/18 08:00 





 02/10/18 07:55 





 Microbiology





02/02/18 14:39   Blood - Peripheral Venous   Blood Culture - Final


                            NO GROWTH AFTER 5 DAYS INCUBATION


02/02/18 13:25   Blood - Peripheral Venous   Blood Culture - Final


                            NO GROWTH AFTER 5 DAYS INCUBATION


02/03/18 03:30   Wound   Gram Stain - Final


02/03/18 03:30   Wound   Wound Culture - Final


                            Escherichia Coli


                            Morganella Morganii


                            Strep Agalactiae Group B


                            Beta Hem Streptococcus Group G


                            Diphtheroid/Corynebacterium











a/p


leukocytosis- rectal temp 99


will get blood cultures


stool cdiff





workup for sacral osteo in progress- imaging pending


currently off antibiotics





esrd/hd

## 2018-02-10 NOTE — PN
Teaching Attending Note


Name of Resident: Gabe Heredia





ATTENDING PHYSICIAN STATEMENT





I saw and evaluated the patient.


I reviewed the resident's note and discussed the case with the resident.


I agree with the resident's findings and plan as documented.








SUBJECTIVE:





pain in sacral area. no other pain or SOB 





OBJECTIVE:


NAD, more symmetry in face today.  lip smacking activity is less noted today   


Cv: RRR


Lungs: decreased breath sounds b/l bases 


Abd:  soft, Nt, ND , NL BS 


Ext: 1 + edema 


Sacral area , with wound vac , removed, stage 4 wound with granulomatous tissue 

and fibrinous exudate . 





A/P 





Unfortunate 63 y/o lady with h/o TD , ESRD, VC syndrome , iron def anemia , 

sacral decub, Dm II , and Ht and other medical problems who presented from 

wound care with unusual facial movements . 





1- Sacral decub ulcer.  leukocytosis worsened today. 


- cxray with no infiltrate


- c diff if stooling 


- MRI of sacarl are is pending  





2- ESRD: cont HD per renal


of zemplar 





3- TD: chronic. monitor 


off her antiphsycotics 





4- h/o seizure , cont keppra 





5- SVc syndrome : cont eliquis 











HLOC

## 2018-02-10 NOTE — PN
Addendum entered and electronically signed by Gabe Heredia,   02/10/18 

16:53: 





Pt has not had MRI yet due to unknown stent type. Vascular surgeon is Dr. Serg Marinelli who did procedure. Office phone number is [phone].


Son is also attempting to get stent information for us.





Addendum entered and electronically signed by Gabe Heredia RESIDENT  02/10/18 

16:11: 





Hypercalcemia - corrected ca 11.44, monitor PTH levels, still pending





Original Note:





Physical Exam: 


SUBJECTIVE: Patient seen and examined at bedside. Appears somewhat lethargic. 

When asked if she feels alright she says yes but further history difficult to 

obtain. No acute events noted overnight.








OBJECTIVE:





 


 Vital Signs











Temperature  98 F   02/10/18 14:02


 


Pulse Rate  94 H  02/10/18 14:02


 


Respiratory Rate  20   02/10/18 14:02


 


Blood Pressure  124/64   02/10/18 14:02


 


O2 Sat by Pulse Oximetry (%)  98   02/09/18 21:00











GENERAL: The patient is awake, alert, and oriented to self, in no acute 

distress. Somewhat lethargic.


HEAD: Normal with no signs of trauma.


LUNGS: Breath sounds equal, clear to auscultation bilaterally, no wheezes, no 

crackles, no accessory muscle use. 


HEART: 2/6 systolic murmur best heard in aortic region, S1, S2, RRR


ABDOMEN: Obese, Soft, nontender, nondistended, normoactive bowel sounds, no 

guarding, no rebound, no hepatosplenomegaly, no masses.


EXTREMITIES:  warm, well-perfused, no edema. 


NEUROLOGICAL: Somewhat lethargic


SKIN: Warm, dry. stage 4 sacral decub ulcer noted w/clean base. wound vac in 

place.














 Laboratory Results - last 24 hr











  02/10/18 02/10/18 02/10/18





  06:19 07:55 07:55


 


WBC    22.0 H D


 


RBC    3.28 L


 


Hgb    9.1 L


 


Hct    29.7 L


 


MCV    90.5


 


MCH    27.8


 


MCHC    30.7 L


 


RDW    18.3 H


 


Plt Count    565 H


 


MPV    7.9  D


 


Total Counted   


 


Neutrophils %   


 


Neutrophils % (Manual)   


 


Lymphocytes %   


 


Lymphocytes % (Manual)   


 


Monocytes % (Manual)   


 


Eosinophils % (Manual)   


 


Basophils % (Manual)   


 


Platelet Estimate   


 


Platelet Comment    No clumping noted


 


Sodium   141 


 


Potassium   4.0 


 


Chloride   100 


 


Carbon Dioxide   31 


 


Anion Gap   10 


 


BUN   41 H 


 


Creatinine   4.4 H 


 


Creat Clearance w eGFR   10.16 


 


POC Glucometer  174  


 


Random Glucose   151 H 


 


Calcium   10.4 H 


 


Total Bilirubin   0.6 


 


AST   45 H 


 


ALT   30 


 


Alkaline Phosphatase   144 H 


 


Total Protein   7.3 


 


Albumin   2.7 L 














  02/10/18 02/10/18





  08:00 12:42


 


WBC  24.1 H 


 


RBC  3.23 L 


 


Hgb  9.3 L 


 


Hct  29.0 L 


 


MCV  89.6 


 


MCH  28.7 


 


MCHC  32.1 


 


RDW  17.4 H 


 


Plt Count  564 H 


 


MPV  7.9 


 


Total Counted  100 


 


Neutrophils %  No Result Required. 


 


Neutrophils % (Manual)  64.0 


 


Lymphocytes %  No Result Required. 


 


Lymphocytes % (Manual)  30.0 


 


Monocytes % (Manual)  2 L 


 


Eosinophils % (Manual)  2.0 


 


Basophils % (Manual)  2.0 


 


Platelet Estimate  Increased 


 


Platelet Comment  


 


Sodium  


 


Potassium  


 


Chloride  


 


Carbon Dioxide  


 


Anion Gap  


 


BUN  


 


Creatinine  


 


Creat Clearance w eGFR  


 


POC Glucometer   188


 


Random Glucose  


 


Calcium  


 


Total Bilirubin  


 


AST  


 


ALT  


 


Alkaline Phosphatase  


 


Total Protein  


 


Albumin  














Imaging:


CXR 2/10/18: Extensive bilateral vascular stenting. No acute chest pathology.











Active Medications











Generic Name Dose Route Start Last Admin





  Trade Name Freq  PRN Reason Stop Dose Admin


 


Acetaminophen  650 mg  02/02/18 19:22  02/10/18 13:13





  Tylenol -  PO   650 mg





  Q4H PRN   Administration





  FEVER   


 


Amino Acids  30 ml  02/09/18 17:30  02/10/18 10:44





  Prosource No Carb Liquid Pkt  PO   30 ml





  BID@0800,1730 KE[facility]   Administration


 


Apixaban  5 mg  02/03/18 10:00  02/10/18 12:49





  Eliquis -  PO   5 mg





  BID KEELY   Administration


 


Artificial Tears  1 drop  02/03/18 10:00  02/10/18 12:48





  Artificial Tears  OD   1 drop





  BID KEELY   Administration


 


Insulin Aspart  1 vial  02/03/18 07:00  02/10/18 12:45





  Novolog Vial Sliding Scale -  SQ   2 units





  TIDAC KEELY   Administration





  Protocol   


 


Lactobacillus Acidophilus  1 tab  02/03/18 10:00  02/10/18 12:45





  Bacid -  GT   1 tab





  BID KEELY   Administration


 


Levetiracetam  500 mg  02/02/18 23:00  02/10/18 12:48





  Keppra Oral Solution -  GT   500 mg





  BID KEELY   Administration


 


Multivitamins  1 each  02/03/18 10:00  02/10/18 12:45





  Total B With C -  PO   1 each





  DAILY KEELY   Administration


 


Nystatin  1 applic  02/03/18 10:00  02/10/18 12:49





  Nystop Powder -  TP   1 applic





  DAILY KEELY   Administration











ASSESSMENT/PLAN:


63 y/o F w/PMH of tardive dyskinesia, ESRD on HD, HTN, SVC syndrome, iron def 

anemia, DM2, GERD, epilepsy is admitted for sepsis secondary to sacral decub 

ulcer.





-Sepsis secondary to sacral decub ulcer


   -holding off on abx currently, cxr with no signs of infection, f/u c. diff


   -wbc up to 24 now, will follow ID recs on abx


   -c/w wound vac use


   -tylenol 650 mg q4h prn for fever or pain





-ESRD


   -on HD. Last session was yesterday


   -nephro on board





-Tardive dyskinesia


   -likely secondary to past antipsychotic use





-Anemia


   -monitor CBC, transfuse if hgb <7


   -h/h stable currently





-hx of seizures


   -c/w keppra 500 mg bid





-svc syndrome


   -c/w eliquis 5 mg po bid





-DVT ppx


   -on eliquis 5 mg po bid





-FEN


   -no fluids


   -monitor electrolytes


   -tube feed w/nepro as per dietary recs





-Dispo:


   -monitor on m/s, to be discharged to SNF when improved.





Visit type





- Emergency Visit


Emergency Visit: Yes


ED Registration Date: 02/02/18


Care time: The patient presented to the Emergency Department on the above date 

and was hospitalized for further evaluation of their emergent condition.





- New Patient


This patient is new to me today: No





- Critical Care


Critical Care patient: No

## 2018-02-10 NOTE — PN
Progress Note (short form)





- Note


Progress Note: 





covering dr mccartney


esrd


s/p interrupted HD yesterday due to hypotension





today she denies any sob


 Current Medications





Acetaminophen (Tylenol -)  650 mg PO Q4H PRN


   PRN Reason: FEVER


   Last Admin: 02/10/18 13:13 Dose:  650 mg


Amino Acids (Prosource No Carb Liquid Pkt)  30 ml PO BID@0800,1730 [facility]


   Last Admin: 02/10/18 18:39 Dose:  30 ml


Apixaban (Eliquis -)  5 mg PO BID [facility]


   Last Admin: 02/10/18 12:49 Dose:  5 mg


Artificial Tears (Artificial Tears)  1 drop OD BID [facility]


   Last Admin: 02/10/18 12:48 Dose:  1 drop


Insulin Aspart (Novolog Vial Sliding Scale -)  1 vial SQ TIDAC [facility]


   PRN Reason: Protocol


   Last Admin: 02/10/18 17:56 Dose:  Not Given


Lactobacillus Acidophilus (Bacid -)  1 tab GT BID [facility]


   Last Admin: 02/10/18 12:45 Dose:  1 tab


Levetiracetam (Keppra Oral Solution -)  500 mg GT BID [facility]


   Last Admin: 02/10/18 12:48 Dose:  500 mg


Multivitamins (Total B With C -)  1 each PO DAILY [facility]


   Last Admin: 02/10/18 12:45 Dose:  1 each


Nystatin (Nystop Powder -)  1 applic TP DAILY [facility]


   Last Admin: 02/10/18 12:49 Dose:  1 applic





 Last Vital Signs











Temp Pulse Resp BP Pulse Ox


 


 98.8 F   91 H  20   101/67   98 


 


 02/10/18 18:26  02/10/18 18:26  02/10/18 18:26  02/10/18 18:26  02/10/18 09:00




















lungs clear


heart reg 


abd soft 


ext no edema














 CBC, BMP





 02/10/18 08:00 





 02/10/18 07:55 








IMP-esrd


euvolemic














Plan- HD on Monday

## 2018-02-11 LAB
ALBUMIN SERPL-MCNC: 2.7 G/DL (ref 3.4–5)
ALP SERPL-CCNC: 140 U/L (ref 45–117)
ALT SERPL-CCNC: 30 U/L (ref 12–78)
ANION GAP SERPL CALC-SCNC: 11 MMOL/L (ref 8–16)
AST SERPL-CCNC: 22 U/L (ref 15–37)
BASOPHILS # BLD: 0.9 % (ref 0–2)
BILIRUB SERPL-MCNC: 0.4 MG/DL (ref 0.2–1)
BUN SERPL-MCNC: 53 MG/DL (ref 7–18)
CALCIUM SERPL-MCNC: 9.9 MG/DL (ref 8.5–10.1)
CHLORIDE SERPL-SCNC: 99 MMOL/L (ref 98–107)
CO2 SERPL-SCNC: 29 MMOL/L (ref 21–32)
CREAT SERPL-MCNC: 5.8 MG/DL (ref 0.55–1.02)
DEPRECATED RDW RBC AUTO: 17.4 % (ref 11.6–15.6)
EOSINOPHIL # BLD: 3.7 % (ref 0–4.5)
GLUCOSE SERPL-MCNC: 146 MG/DL (ref 74–106)
HCT VFR BLD CALC: 29.1 % (ref 32.4–45.2)
HGB BLD-MCNC: 9.4 GM/DL (ref 10.7–15.3)
LYMPHOCYTES # BLD: 27 % (ref 8–40)
MCH RBC QN AUTO: 28.5 PG (ref 25.7–33.7)
MCHC RBC AUTO-ENTMCNC: 32.5 G/DL (ref 32–36)
MCV RBC: 87.7 FL (ref 80–96)
MONOCYTES # BLD AUTO: 4.5 % (ref 3.8–10.2)
NEUTROPHILS # BLD: 63.9 % (ref 42.8–82.8)
PLATELET # BLD AUTO: 583 K/MM3 (ref 134–434)
PMV BLD: 7.1 FL (ref 7.5–11.1)
POTASSIUM SERPLBLD-SCNC: 3.6 MMOL/L (ref 3.5–5.1)
PROT SERPL-MCNC: 7.1 G/DL (ref 6.4–8.2)
PTH-INTACT SERPL-MCNC: 215 PG/ML (ref 15–65)
RBC # BLD AUTO: 3.31 M/MM3 (ref 3.6–5.2)
SODIUM SERPL-SCNC: 139 MMOL/L (ref 136–145)
WBC # BLD AUTO: 10.6 K/MM3 (ref 4–10)

## 2018-02-11 RX ADMIN — VITAMIN C SCH EACH: TAB at 11:22

## 2018-02-11 RX ADMIN — APIXABAN SCH MG: 5 TABLET, FILM COATED ORAL at 22:12

## 2018-02-11 RX ADMIN — LEVETIRACETAM SCH MG: 100 SOLUTION ORAL at 11:23

## 2018-02-11 RX ADMIN — POLYVINYL ALCOHOL SCH DROP: 14 SOLUTION/ DROPS OPHTHALMIC at 22:12

## 2018-02-11 RX ADMIN — Medication SCH TAB: at 11:22

## 2018-02-11 RX ADMIN — Medication SCH ML: at 18:47

## 2018-02-11 RX ADMIN — ACETAMINOPHEN PRN MG: 325 TABLET ORAL at 11:21

## 2018-02-11 RX ADMIN — POLYVINYL ALCOHOL SCH DROP: 14 SOLUTION/ DROPS OPHTHALMIC at 11:23

## 2018-02-11 RX ADMIN — APIXABAN SCH MG: 5 TABLET, FILM COATED ORAL at 11:22

## 2018-02-11 RX ADMIN — LEVETIRACETAM SCH MG: 100 SOLUTION ORAL at 22:12

## 2018-02-11 RX ADMIN — INSULIN ASPART SCH UNITS: 100 INJECTION, SOLUTION INTRAVENOUS; SUBCUTANEOUS at 12:23

## 2018-02-11 RX ADMIN — NYSTATIN SCH APPLIC: 100000 POWDER TOPICAL at 11:23

## 2018-02-11 RX ADMIN — Medication SCH ML: at 09:22

## 2018-02-11 RX ADMIN — INSULIN ASPART SCH: 100 INJECTION, SOLUTION INTRAVENOUS; SUBCUTANEOUS at 17:48

## 2018-02-11 RX ADMIN — INSULIN ASPART SCH: 100 INJECTION, SOLUTION INTRAVENOUS; SUBCUTANEOUS at 07:20

## 2018-02-11 RX ADMIN — Medication SCH TAB: at 22:12

## 2018-02-11 NOTE — PN
Progress Note (short form)





- Note


Progress Note: 





awake and alert


feels well





no complaints





vac on sacrum - placed yesterday











 


 Vital Signs











 Period  Temp  Pulse  Resp  BP Sys/Brown  Pulse Ox


 


 Last 24 Hr  97.4 F-99.9 F  91-95  18-20  101-138/52-89  98








cor-rrr


lungs clear


abd - soft,nt


ext no edema


vac in place





 CBC, BMP





 02/11/18 06:00 





 02/11/18 06:00 





 Microbiology





02/02/18 14:39   Blood - Peripheral Venous   Blood Culture - Final


                            NO GROWTH AFTER 5 DAYS INCUBATION


02/02/18 13:25   Blood - Peripheral Venous   Blood Culture - Final


                            NO GROWTH AFTER 5 DAYS INCUBATION


02/03/18 03:30   Wound   Gram Stain - Final


02/03/18 03:30   Wound   Wound Culture - Final


                            Escherichia Coli


                            Morganella Morganii


                            Strep Agalactiae Group B


                            Beta Hem Streptococcus Group G


                            Diphtheroid/Corynebacterium








 











a/p


leukocytosis- resolved!, transient


f/u blood cultures


f/u cdiff





workup for sacral osteo in progress- imaging pending


currently off antibiotics





esrd/hd

## 2018-02-11 NOTE — PN
Progress Note (short form)





- Note


Progress Note: 





covering dr mccartney


esrd


s/p interrupted HD yesterday due to hypotension





today she denies any sob











 Current Medications





Acetaminophen (Tylenol -)  650 mg PO Q4H PRN


   PRN Reason: FEVER


   Last Admin: 02/11/18 11:21 Dose:  650 mg


Amino Acids (Prosource No Carb Liquid Pkt)  30 ml PO BID@0800,1730 [facility]


   Last Admin: 02/11/18 18:47 Dose:  30 ml


Apixaban (Eliquis -)  5 mg PO BID [facility]


   Last Admin: 02/11/18 11:22 Dose:  5 mg


Artificial Tears (Artificial Tears)  1 drop OD BID [facility]


   Last Admin: 02/11/18 11:23 Dose:  1 drop


Insulin Aspart (Novolog Vial Sliding Scale -)  1 vial SQ TIDAC [facility]


   PRN Reason: Protocol


   Last Admin: 02/11/18 17:48 Dose:  Not Given


Lactobacillus Acidophilus (Bacid -)  1 tab GT BID [facility]


   Last Admin: 02/11/18 11:22 Dose:  1 tab


Levetiracetam (Keppra Oral Solution -)  500 mg GT BID [facility]


   Last Admin: 02/11/18 11:23 Dose:  500 mg


Metronidazole (Flagyl -)  500 mg PO TID [facility]


   Last Admin: 02/11/18 16:07 Dose:  500 mg


Multivitamins (Total B With C -)  1 each PO DAILY [facility]


   Last Admin: 02/11/18 11:22 Dose:  1 each


Nystatin (Nystop Powder -)  1 applic TP DAILY [facility]


   Last Admin: 02/11/18 11:23 Dose:  1 applic











 Last Vital Signs











Temp Pulse Resp BP Pulse Ox


 


 98.8 F   96 H  20   115/66   98 


 


 02/11/18 18:16  02/11/18 18:16  02/11/18 18:16  02/11/18 18:16  02/11/18 09:00














lungs clear


heart reg 


abd soft 


ext no edema








 CBC, BMP





 02/11/18 06:00 





 02/11/18 06:00 


























IMP-esrd


euvolemic


sacral decub- going for ct scan to r/o osteo














Plan- HD tomorrow  Monday after CT scan

## 2018-02-11 NOTE — PN
Progress Note (short form)





- Note


Progress Note: 





Subjective:


pain in sacral area . No SOB 





Objective:








Vital Signs:


 Last Vital Signs











Temp Pulse Resp BP Pulse Ox


 


 98.1 F   95 H  18   124/60   98 


 


 02/11/18 11:36  02/11/18 11:36  02/11/18 11:36  02/11/18 11:36  02/10/18 21:00











 Laboratory Results - last 24 hr











  02/10/18 02/10/18 02/10/18





  07:55 12:42 17:12


 


WBC   


 


RBC   


 


Hgb   


 


Hct   


 


MCV   


 


MCH   


 


MCHC   


 


RDW   


 


Plt Count   


 


MPV   


 


Neutrophils %   


 


Lymphocytes %   


 


Monocytes %   


 


Eosinophils %   


 


Basophils %   


 


Sodium   


 


Potassium   


 


Chloride   


 


Carbon Dioxide   


 


Anion Gap   


 


BUN   


 


Creatinine   


 


Creat Clearance w eGFR   


 


POC Glucometer   188  144


 


Random Glucose   


 


Calcium  10.5 H  


 


Total Bilirubin   


 


AST   


 


ALT   


 


Alkaline Phosphatase   


 


Total Protein   


 


Albumin   


 


PTH Intact  215 H  


 


PTH Intact Intraop 0 m    














  02/11/18 02/11/18 02/11/18





  06:00 06:00 06:39


 


WBC  10.6 H D  


 


RBC  3.31 L  


 


Hgb  9.4 L  


 


Hct  29.1 L  


 


MCV  87.7  


 


MCH  28.5  


 


MCHC  32.5  


 


RDW  17.4 H  


 


Plt Count  583 H  


 


MPV  7.1 L D  


 


Neutrophils %  63.9  


 


Lymphocytes %  27.0  D  


 


Monocytes %  4.5  


 


Eosinophils %  3.7  D  


 


Basophils %  0.9  


 


Sodium   139 


 


Potassium   3.6 


 


Chloride   99 


 


Carbon Dioxide   29 


 


Anion Gap   11 


 


BUN   53 H 


 


Creatinine   5.8 H 


 


Creat Clearance w eGFR   7.39 


 


POC Glucometer    156


 


Random Glucose   146 H 


 


Calcium   9.9 


 


Total Bilirubin   0.4  D 


 


AST   22 


 


ALT   30 


 


Alkaline Phosphatase   140 H 


 


Total Protein   7.1 


 


Albumin   2.7 L 


 


PTH Intact   


 


PTH Intact Intraop 0 m   














  02/11/18





  12:01


 


WBC 


 


RBC 


 


Hgb 


 


Hct 


 


MCV 


 


MCH 


 


MCHC 


 


RDW 


 


Plt Count 


 


MPV 


 


Neutrophils % 


 


Lymphocytes % 


 


Monocytes % 


 


Eosinophils % 


 


Basophils % 


 


Sodium 


 


Potassium 


 


Chloride 


 


Carbon Dioxide 


 


Anion Gap 


 


BUN 


 


Creatinine 


 


Creat Clearance w eGFR 


 


POC Glucometer  179


 


Random Glucose 


 


Calcium 


 


Total Bilirubin 


 


AST 


 


ALT 


 


Alkaline Phosphatase 


 


Total Protein 


 


Albumin 


 


PTH Intact 


 


PTH Intact Intraop 0 m 

















Physical Exam:


NAD, mor, 3/6 SM at base and LLSB 


Lungs: decreased breath sounds b/l bases 


Abd:  soft, Nt, ND , NL BS . GT in place


Ext: 1 + edema 


sacral wound not examined today 





A/P 





Unfortunate 63 y/o lady with h/o TD , ESRD, VC syndrome , iron def anemia , 

sacral decub, Dm II , and Ht and other medical problems who presented from 

wound care with unusual facial movements . 





1- Sacral decub ulcer. 


- unable to obtain MRI yet, will d/w Nephrology getting CT with contrast before 

HD tomorrow 





2- C diff infection :  start IV flagyl q 8 hr 





3- ESRD: cont HD per renal


off zemplar 





4- TD: chronic. monitor 


off her antiphsycotics 





5- h/o seizure , cont keppra 





6- SVc syndrome : cont eliquis 





7- Diet : Nepro 6pm -7 am. 


dysphagia diet during day , change  fluids to thickened as sh ecoughs with 

thins 

















Visit type





- Emergency Visit


Emergency Visit: Yes


ED Registration Date: 02/02/18


Care time: The patient presented to the Emergency Department on the above date 

and was hospitalized for further evaluation of their emergent condition.





- New Patient


This patient is new to me today: No





- Critical Care


Critical Care patient: No

## 2018-02-12 RX ADMIN — Medication SCH TAB: at 21:57

## 2018-02-12 RX ADMIN — INSULIN ASPART SCH UNITS: 100 INJECTION, SOLUTION INTRAVENOUS; SUBCUTANEOUS at 11:18

## 2018-02-12 RX ADMIN — Medication SCH ML: at 08:17

## 2018-02-12 RX ADMIN — POLYVINYL ALCOHOL SCH DROP: 14 SOLUTION/ DROPS OPHTHALMIC at 09:09

## 2018-02-12 RX ADMIN — Medication SCH TAB: at 09:08

## 2018-02-12 RX ADMIN — APIXABAN SCH MG: 5 TABLET, FILM COATED ORAL at 09:09

## 2018-02-12 RX ADMIN — INSULIN ASPART SCH: 100 INJECTION, SOLUTION INTRAVENOUS; SUBCUTANEOUS at 16:21

## 2018-02-12 RX ADMIN — LEVETIRACETAM SCH MG: 100 SOLUTION ORAL at 09:09

## 2018-02-12 RX ADMIN — POLYVINYL ALCOHOL SCH DROP: 14 SOLUTION/ DROPS OPHTHALMIC at 21:58

## 2018-02-12 RX ADMIN — VITAMIN C SCH EACH: TAB at 09:08

## 2018-02-12 RX ADMIN — INSULIN ASPART SCH: 100 INJECTION, SOLUTION INTRAVENOUS; SUBCUTANEOUS at 06:41

## 2018-02-12 RX ADMIN — NYSTATIN SCH APPLIC: 100000 POWDER TOPICAL at 09:09

## 2018-02-12 RX ADMIN — LEVETIRACETAM SCH MG: 100 SOLUTION ORAL at 21:58

## 2018-02-12 RX ADMIN — Medication SCH ML: at 16:50

## 2018-02-12 RX ADMIN — APIXABAN SCH MG: 5 TABLET, FILM COATED ORAL at 21:57

## 2018-02-12 NOTE — PN
Teaching Attending Note


Name of Resident: Donta Butterfield





ATTENDING PHYSICIAN STATEMENT





I saw and evaluated the patient.


I reviewed the resident's note and discussed the case with the resident.


I agree with the resident's findings and plan as documented.








SUBJECTIVE:


No fever or chills. has  pain in sacrum 





OBJECTIVE:


NAD,


CV:  3/6 SM at base and LLSB 


Lungs: decreased breath sounds b/l bases 


Abd:  soft, Nt, ND , NL BS . GT in place


Ext: 1 + edema 


sacral wound not examined today 





A/P 





Unfortunate 63 y/o lady with h/o TD , ESRD, VC syndrome , iron def anemia , 

sacral decub, Dm II , and Ht and other medical problems who presented from 

wound care with unusual facial movements . 





1- Sacral decub ulcer. 


- CT scan with evidence of OM . 


- will contact ID regarding choice of Abx in am 





2- C diff infection : ( + Ag but - toxin but with diarrhea )  


- cont flagyl q 8hr 





3- ESRD: cont HD per renal


off zemplar 





4- TD: chronic. monitor 


off her antiphsycotics 





5- h/o seizure , cont keppra 





6- SVc syndrome : cont eliquis 





7- Diet : Nepro 6pm -7 am. 


dysphagia diet during day , with  nectar thick liquids 








HLOC . need PICC . then possible dc to rehab tomorrow

## 2018-02-12 NOTE — PN
Progress Note, SLP





- Note


Progress Note: 





 Selected Entries











  02/10/18 02/10/18 02/11/18





  10:44 18:26 10:31


 


Breakfast 50%  50%


 


Lunch   


 


Supper  25% 














  02/11/18 02/11/18 02/12/18





  14:23 18:16 09:45


 


Breakfast   75%


 


Lunch 50%  


 


Supper  50% 








 Laboratory Tests











  02/10/18 02/11/18





  07:55 06:00


 


WBC  22.0 H D  10.6 H D








Pt doing quite well with PO intake. Tolerated thin liquids on Friday. Reported 

to be coughing on thin liquid over weekend, downgraded to nectar thick liquid. 

Cough response could have been due to pt distractibility and delayed swallow vs 

feeding technique and use of compensatory strategies 9eg needs to be fully 

upright, chin flexed, single sips, no continuous deinking, avoid straws).


Nectar thick liquid is safer and sufficient


PO hydration is not an issue presently as pt receives GT feedings nocturnally.





As pt improves and GT feedings are less necessary, consider repeated mbs as out 

pt, to wean from GT and to upgrade diet safely.

## 2018-02-12 NOTE — PN
Progress Note (short form)





- Note


Progress Note: 





Spoke to patient's son Sha over the phone to discuss imaging. Discussed 

reasons why MRI is unable to be done at this time (unknown date of stent 

placement/unknown types of stents. Discussed the option of CT scan with 

contrast. Discussed that patient will get the scan with contrast and will get 

dialyzed after her scan. Patient understood the plan and verbally agreed to 

follow through with the CT scan.





-Donta Butterfield D.O., PGY1

## 2018-02-12 NOTE — PN
Physical Exam: 


SUBJECTIVE: Patient seen and examined at bedside. She is awake and alert, only 

complains of pain on her sacrum. Denies fevers, chills, nausea, vomiting, 

diarrhea.








OBJECTIVE:





 Vital Signs











 Period  Temp  Pulse  Resp  BP Sys/Brown  Pulse Ox


 


 Last 24 Hr  97.6 F-98.8 F  87-96  20-20  108-118/55-70  98-99











GENERAL: The patient is awake, alert, and fully oriented, in no acute distress.


HEAD: Normal with no signs of trauma.


LUNGS: Breath sounds equal, clear to auscultation bilaterally, no wheezes, no 

crackles, no accessory muscle use. 


HEART: 3/6 holosystolic murmur, S1, S2 without murmur, rub or gallop.


ABDOMEN: Obese, Soft, nontender, nondistended, normoactive bowel sounds, no 

guarding, no rebound, no hepatosplenomegaly, no masses.


EXTREMITIES: 2+ pulses, warm, well-perfused, no edema. Surgical scars noted on 

R arm representative of previous graft/fistula. large stage 4 Sacral decubitus 

- wound vac in place


NEUROLOGICAL: Cranial nerves II through XII grossly intact. Lip smacking 

observed


PSYCH: Normal mood, normal affect.


SKIN: Warm, dry, normal turgor, no rashes or lesions noted

















 Laboratory Results - last 24 hr











  02/11/18 02/12/18 02/12/18





  17:42 06:40 11:18


 


POC Glucometer  125  145  226








Active Medications











Generic Name Dose Route Start Last Admin





  Trade Name Freq  PRN Reason Stop Dose Admin


 


Acetaminophen  650 mg  02/02/18 19:22  02/11/18 11:21





  Tylenol -  PO   650 mg





  Q4H PRN   Administration





  FEVER   


 


Amino Acids  30 ml  02/09/18 17:30  02/12/18 08:17





  Prosource No Carb Liquid Pkt  PO   30 ml





  BID@0800,1730 KEELY   Administration


 


Apixaban  5 mg  02/03/18 10:00  02/12/18 09:09





  Eliquis -  PO   5 mg





  BID KEELY   Administration


 


Artificial Tears  1 drop  02/03/18 10:00  02/12/18 09:09





  Artificial Tears  OD   1 drop





  BID KEELY   Administration


 


Insulin Aspart  1 vial  02/03/18 07:00  02/12/18 11:18





  Novolog Vial Sliding Scale -  SQ   4 units





  TIDAC KEELY   Administration





  Protocol   


 


Lactobacillus Acidophilus  1 tab  02/03/18 10:00  02/12/18 09:08





  Bacid -  GT   1 tab





  BID KEELY   Administration


 


Levetiracetam  500 mg  02/02/18 23:00  02/12/18 09:09





  Keppra Oral Solution -  GT   500 mg





  BID KEELY   Administration


 


Metronidazole  500 mg  02/11/18 15:00  02/12/18 14:12





  Flagyl -  PO   500 mg





  TID KEELY   Administration


 


Multivitamins  1 each  02/03/18 10:00  02/12/18 09:08





  Total B With C -  PO   1 each





  DAILY KEELY   Administration


 


Nystatin  1 applic  02/03/18 10:00  02/12/18 09:09





  Nystop Powder -  TP   1 applic





  DAILY KEELY   Administration











ASSESSMENT/PLAN:





62 year old female with a hx of sacral decubitus, tardive diskinesia, ESRD on HD

, HTN, SVC syndrome/clot, iron-deficiency anemia, Type 2 diabetes, GERD, and 

epilepsy is admitted to the hospital for sepsis 2/2 sacral decubitus





#Sepsis 2/2 sacral ulcer: stable


-no current abx


-CT sacrum returned positive for suspected osteomyelitis, will likely need to 

be treated with long term ABX


-appreciate ID consultation


-blood cultures negative


-wound cultures grew multiple organisms


-lactic acid normalized


-wound vac in place - patient likely returning to [facility] once medically stable.


-tylenol 650 PRN pain





#Tardive Dyskinesia: stable, was due to antipsychotic use - stopped 6 months ago


-appreciate neuro consultation





#ESRD: stable, on HD


-HD today


-appreciate nephrology recommendations





#Anemia: stable


-transfuse as necessary if hgb < 7





#Seizure Disorder: stable


-continue keppra 500 BID





#SVC syndrome/clot: stable


-continue eliquis 5mg BID





#FEN


-tube feed nepro, appreciate speech/swallow reccs (6pm-6am - 12 hours)


-dysphagia puree


-careful with repletion


-no standing fluids





#Prophylaxis


-On eliquis





#Disposition:


-possible DC to SNF in AM pending MRI results





Visit type





- Emergency Visit


Emergency Visit: No





- New Patient


This patient is new to me today: No





- Critical Care


Critical Care patient: No

## 2018-02-12 NOTE — PN
Progress Note, Physician


History of Present Illness: 





Pt seen and examined at bedside. She is awake and appears comfortable. She just 

went for her cat scan. She is due for HD today. 





- Current Medication List


Current Medications: 


Active Medications





Acetaminophen (Tylenol -)  650 mg PO Q4H PRN


   PRN Reason: FEVER


   Last Admin: 02/11/18 11:21 Dose:  650 mg


Amino Acids (Prosource No Carb Liquid Pkt)  30 ml PO BID@0800,1730 [facility]


   Last Admin: 02/12/18 08:17 Dose:  30 ml


Apixaban (Eliquis -)  5 mg PO BID [facility]


   Last Admin: 02/12/18 09:09 Dose:  5 mg


Artificial Tears (Artificial Tears)  1 drop OD BID [facility]


   Last Admin: 02/12/18 09:09 Dose:  1 drop


Insulin Aspart (Novolog Vial Sliding Scale -)  1 vial SQ TIDAC [facility]


   PRN Reason: Protocol


   Last Admin: 02/12/18 11:18 Dose:  4 units


Lactobacillus Acidophilus (Bacid -)  1 tab GT BID [facility]


   Last Admin: 02/12/18 09:08 Dose:  1 tab


Levetiracetam (Keppra Oral Solution -)  500 mg GT BID [facility]


   Last Admin: 02/12/18 09:09 Dose:  500 mg


Metronidazole (Flagyl -)  500 mg PO TID [facility]


   Last Admin: 02/12/18 14:12 Dose:  500 mg


Multivitamins (Total B With C -)  1 each PO DAILY [facility]


   Last Admin: 02/12/18 09:08 Dose:  1 each


Nystatin (Nystop Powder -)  1 applic TP DAILY [facility]


   Last Admin: 02/12/18 09:09 Dose:  1 applic











- Objective


Vital Signs: 


 Vital Signs











Temperature  97.6 F   02/12/18 14:48


 


Pulse Rate  87   02/12/18 14:48


 


Respiratory Rate  20   02/12/18 14:48


 


Blood Pressure  108/70   02/12/18 14:48


 


O2 Sat by Pulse Oximetry (%)  98   02/12/18 09:00











Constitutional: Yes: Calm


Eyes: Yes: Conjunctiva Clear


HENT: Yes: Atraumatic


Cardiovascular: Yes: S1, S2


Respiratory: Yes: CTA Bilaterally


Gastrointestinal: Yes: Soft, Abdomen, Obese, Other (peg)


Genitourinary: Yes: Incontinence


Musculoskeletal: Yes: Muscle Weakness


Edema: Yes


Edema: LLE: Trace, RLE: Trace


Neurological: Yes: Pre-Existing Deficit


Labs: 


 CBC, BMP





 02/11/18 06:00 





 02/11/18 06:00 





 INR, PTT











INR  1.42  (0.82-1.09)  H  02/03/18  06:10    














Problem List





- Problems


(1) Decubital ulcer


Code(s): L89.90 - PRESSURE ULCER OF UNSPECIFIED SITE, UNSPECIFIED STAGE   





(2) ESRD (end stage renal disease)


Code(s): N18.6 - END STAGE RENAL DISEASE   





(3) Sepsis


Code(s): A41.9 - SEPSIS, UNSPECIFIED ORGANISM   





Assessment/Plan


 Current Medications











Generic Name Dose Route Start Last Admin





  Trade Name Freq  PRN Reason Stop Dose Admin


 


Acetaminophen  650 mg  02/02/18 19:22  02/11/18 11:21





  Tylenol -  PO   650 mg





  Q4H PRN   Administration





  FEVER   


 


Amino Acids  30 ml  02/09/18 17:30  02/12/18 08:17





  Prosource No Carb Liquid Pkt  PO   30 ml





  BID@0800,1730 KEELY   Administration


 


Apixaban  5 mg  02/03/18 10:00  02/12/18 09:09





  Eliquis -  PO   5 mg





  BID KEELY   Administration


 


Artificial Tears  1 drop  02/03/18 10:00  02/12/18 09:09





  Artificial Tears  OD   1 drop





  BID KEELY   Administration


 


Insulin Aspart  1 vial  02/03/18 07:00  02/12/18 11:18





  Novolog Vial Sliding Scale -  SQ   4 units





  TIDAC KEELY   Administration





  Protocol   


 


Lactobacillus Acidophilus  1 tab  02/03/18 10:00  02/12/18 09:08





  Bacid -  GT   1 tab





  BID KEELY   Administration


 


Levetiracetam  500 mg  02/02/18 23:00  02/12/18 09:09





  Keppra Oral Solution -  GT   500 mg





  BID KEELY   Administration


 


Metronidazole  500 mg  02/11/18 15:00  02/12/18 14:12





  Flagyl -  PO   500 mg





  TID KEELY   Administration


 


Multivitamins  1 each  02/03/18 10:00  02/12/18 09:08





  Total B With C -  PO   1 each





  DAILY KEELY   Administration


 


Nystatin  1 applic  02/03/18 10:00  02/12/18 09:09





  Nystop Powder -  TP   1 applic





  DAILY KEELY   Administration





 Laboratory Tests











  02/10/18 02/10/18 02/11/18





  07:55 07:55 06:00


 


Calcium  10.4 H  10.5 H  9.9

















Impression


1. ESRD


2. anemia


3. change in mental status


4. epilepsy


5. htn


6. dm


7. gerd


8. obesity


9. hypercalcemia








Plan


- pt going for HD today


- calcium improving


- follow pth


- zemplar on hold


- follow up ct results


- epogen for anemia


- monitor hg


- will follow


Dr Young

## 2018-02-13 LAB
ANION GAP SERPL CALC-SCNC: 7 MMOL/L (ref 8–16)
BUN SERPL-MCNC: 40 MG/DL (ref 7–18)
CALCIUM SERPL-MCNC: 9.6 MG/DL (ref 8.5–10.1)
CHLORIDE SERPL-SCNC: 100 MMOL/L (ref 98–107)
CO2 SERPL-SCNC: 31 MMOL/L (ref 21–32)
CREAT SERPL-MCNC: 4.6 MG/DL (ref 0.55–1.02)
DEPRECATED RDW RBC AUTO: 18.1 % (ref 11.6–15.6)
GLUCOSE SERPL-MCNC: 143 MG/DL (ref 74–106)
HCT VFR BLD CALC: 28.2 % (ref 32.4–45.2)
HGB BLD-MCNC: 9 GM/DL (ref 10.7–15.3)
MCH RBC QN AUTO: 28.5 PG (ref 25.7–33.7)
MCHC RBC AUTO-ENTMCNC: 31.9 G/DL (ref 32–36)
MCV RBC: 89.3 FL (ref 80–96)
PLATELET # BLD AUTO: 577 K/MM3 (ref 134–434)
PMV BLD: 7.5 FL (ref 7.5–11.1)
POTASSIUM SERPLBLD-SCNC: 4.2 MMOL/L (ref 3.5–5.1)
RBC # BLD AUTO: 3.15 M/MM3 (ref 3.6–5.2)
SODIUM SERPL-SCNC: 138 MMOL/L (ref 136–145)
WBC # BLD AUTO: 9.2 K/MM3 (ref 4–10)

## 2018-02-13 RX ADMIN — POLYVINYL ALCOHOL SCH DROP: 14 SOLUTION/ DROPS OPHTHALMIC at 21:03

## 2018-02-13 RX ADMIN — APIXABAN SCH MG: 5 TABLET, FILM COATED ORAL at 11:35

## 2018-02-13 RX ADMIN — INSULIN ASPART SCH: 100 INJECTION, SOLUTION INTRAVENOUS; SUBCUTANEOUS at 06:08

## 2018-02-13 RX ADMIN — Medication SCH ML: at 17:35

## 2018-02-13 RX ADMIN — INSULIN ASPART SCH: 100 INJECTION, SOLUTION INTRAVENOUS; SUBCUTANEOUS at 18:59

## 2018-02-13 RX ADMIN — Medication SCH TAB: at 21:02

## 2018-02-13 RX ADMIN — POLYVINYL ALCOHOL SCH: 14 SOLUTION/ DROPS OPHTHALMIC at 11:35

## 2018-02-13 RX ADMIN — ACETAMINOPHEN PRN MG: 325 TABLET ORAL at 17:35

## 2018-02-13 RX ADMIN — Medication SCH TAB: at 11:35

## 2018-02-13 RX ADMIN — VITAMIN C SCH EACH: TAB at 11:35

## 2018-02-13 RX ADMIN — LEVETIRACETAM SCH MG: 100 SOLUTION ORAL at 11:35

## 2018-02-13 RX ADMIN — APIXABAN SCH MG: 5 TABLET, FILM COATED ORAL at 21:02

## 2018-02-13 RX ADMIN — INSULIN ASPART SCH: 100 INJECTION, SOLUTION INTRAVENOUS; SUBCUTANEOUS at 12:26

## 2018-02-13 RX ADMIN — LEVETIRACETAM SCH MG: 100 SOLUTION ORAL at 21:02

## 2018-02-13 RX ADMIN — Medication SCH: at 09:00

## 2018-02-13 RX ADMIN — NYSTATIN SCH: 100000 POWDER TOPICAL at 11:35

## 2018-02-13 NOTE — PN
Progress Note (short form)





- Note


Progress Note: 





VAscular Surgery 





CT scan reviewed. 


VAC in place. 


White foam had been placed on sacrum due to exposed bone. 


CT scan brings up a question of phegmon around coccyx, that is not a collection 


that is drainable. 


ID note reviewed - will need flagyl for 6 weeks. 


Cont vac, offload area. 


Pt needs good nutrition. 


WBC is 9.2. 


Will follow. 


Would not do bone biopsy, as that would create a wound involving bone. 


CT shows osteo as it is. 


Will follow 





Jony treviño DO

## 2018-02-13 NOTE — PN
Teaching Attending Note


Name of Resident: Donta Butterfield





ATTENDING PHYSICIAN STATEMENT





I saw and evaluated the patient.


I reviewed the resident's note and discussed the case with the resident.


I agree with the resident's findings and plan as documented.








SUBJECTIVE:


no fever or chills. has abd pain and sacral pain 





OBJECTIVE:


NAD,


CV:  3/6 SM at base and LLSB 


Lungs: decreased breath sounds b/l bases 


Abd:  soft, Nt, ND , NL BS . GT in place


Ext: 1 + edema  





A/P 





Unfortunate 63 y/o lady with h/o TD , ESRD, VC syndrome , iron def anemia , 

sacral decub, Dm II , and Ht and other medical problems who presented from 

wound care with unusual facial movements . 





1- Sacral decub ulcer with OM 


- appreciate Dr. treviño input regarding possible Bone bx 


- if no bone bx indicated , then ancef + po flagyl  treatment 


 


2- C diff infection : ( + Ag but - toxin but with diarrhea )  


- cont flagyl q 8hr 





3- ESRD: cont HD per renal


off zemplar 





4- TD: chronic. monitor 


off her antiphsycotics 





5- h/o seizure , cont keppra 





6- SVc syndrome: cont eliquis 





7- Diet : Nepro 6pm -7 am. 


dysphagia diet during day , with  nectar thick liquids 








dipo : possible tomorrow if no Bone Bx to be performed

## 2018-02-13 NOTE — PN
Progress Note, SLP





- Note


Progress Note: 





 Selected Entries











  02/12/18 02/12/18 02/12/18





  05:41 09:00 09:45


 


Breakfast   75%


 


Lunch   


 


Temperature 98.7 F 98.1 F 














  02/12/18 02/12/18 02/12/18





  14:48 17:35 18:00


 


Breakfast   


 


Lunch 75%  


 


Temperature 97.6 F 97.9 F 97.7 F














  02/12/18 02/13/18 02/13/18





  22:00 06:00 10:23


 


Breakfast   NPO


 


Lunch   


 


Temperature 98.8 F 98.9 F 








 Laboratory Tests











  02/13/18





  07:45


 


WBC  9.2








Tolerating diet. NPO today. 


May want to continue nocturnal feedings for improved nutritional support/wound 

healing. 


Increase increase nutritional/protein by mouth at NH, and when wound is well 

healed, can wean from GT?.

## 2018-02-13 NOTE — PN
Physical Exam: 


SUBJECTIVE: Patient seen and examined at bedside. No acute changes from 

yesterday. Pt got HD yesterday and is scheduled for tomorrow.








OBJECTIVE:





 Vital Signs











 Period  Temp  Pulse  Resp  BP Sys/Brown  Pulse Ox


 


 Last 24 Hr  97.7 F-98.9 F    18-20  109-143/44-74  98











GENERAL: The patient is awake, alert, and fully oriented, in no acute distress.


HEAD: Normal with no signs of trauma.


LUNGS: Breath sounds equal, clear to auscultation bilaterally, no wheezes, no 

crackles, no accessory muscle use. 


HEART: 3/6 holosystolic murmur, S1, S2 without murmur, rub or gallop.


ABDOMEN: Obese, Soft, nontender, nondistended, normoactive bowel sounds, no 

guarding, no rebound, no hepatosplenomegaly, no masses.


EXTREMITIES: 2+ pulses, warm, well-perfused, no edema. Surgical scars noted on 

R arm representative of previous graft/fistula. large stage 4 Sacral decubitus 

- wound vac in place


NEUROLOGICAL: Cranial nerves II through XII grossly intact. Lip smacking 

observed


PSYCH: Normal mood, normal affect.


SKIN: Warm, dry, normal turgor, no rashes or lesions noted














 Laboratory Results - last 24 hr











  02/12/18 02/13/18 02/13/18





  21:43 06:06 07:45


 


WBC    9.2


 


RBC    3.15 L


 


Hgb    9.0 L


 


Hct    28.2 L


 


MCV    89.3


 


MCH    28.5


 


MCHC    31.9 L


 


RDW    18.1 H


 


Plt Count    577 H


 


MPV    7.5


 


Sodium   


 


Potassium   


 


Chloride   


 


Carbon Dioxide   


 


Anion Gap   


 


BUN   


 


Creatinine   


 


POC Glucometer  104  138 


 


Random Glucose   


 


Calcium   














  02/13/18 02/13/18





  07:45 11:55


 


WBC  


 


RBC  


 


Hgb  


 


Hct  


 


MCV  


 


MCH  


 


MCHC  


 


RDW  


 


Plt Count  


 


MPV  


 


Sodium  138 


 


Potassium  4.2 


 


Chloride  100 


 


Carbon Dioxide  31 


 


Anion Gap  7 L 


 


BUN  40 H 


 


Creatinine  4.6 H 


 


POC Glucometer   143


 


Random Glucose  143 H 


 


Calcium  9.6 








Active Medications











Generic Name Dose Route Start Last Admin





  Trade Name Freq  PRN Reason Stop Dose Admin


 


Acetaminophen  650 mg  02/02/18 19:22  02/13/18 17:35





  Tylenol -  PO   650 mg





  Q4H PRN   Administration





  FEVER   


 


Amino Acids  30 ml  02/09/18 17:30  02/13/18 17:35





  Prosource No Carb Liquid Pkt  PO   30 ml





  BID@0800,1730 KEELY   Administration


 


Apixaban  5 mg  02/03/18 10:00  02/13/18 11:35





  Eliquis -  PO   5 mg





  BID KEELY   Administration


 


Artificial Tears  1 drop  02/03/18 10:00  02/13/18 11:35





  Artificial Tears  OD   Not Given





  BID KEELY   


 


Epoetin Duglas  10,000 unit  02/14/18 16:45  





  Epogen -  IVPUSH  02/14/18 16:46  





  ONCE ONE   


 


IV Flush  8 ml  02/12/18 20:00  





  Picc Line Flush  IVPUSH   





  PRN PRN   





  Protocol   


 


Insulin Aspart  1 vial  02/03/18 07:00  02/13/18 12:26





  Novolog Vial Sliding Scale -  SQ   Not Given





  TIDAC [facility]   





  Protocol   


 


Lactobacillus Acidophilus  1 tab  02/03/18 10:00  02/13/18 11:35





  Bacid -  GT   1 tab





  BID KEELY   Administration


 


Levetiracetam  500 mg  02/02/18 23:00  02/13/18 11:35





  Keppra Oral Solution -  GT   500 mg





  BID KEELY   Administration


 


Metronidazole  500 mg  02/11/18 15:00  02/13/18 16:02





  Flagyl -  PO   500 mg





  TID KEELY   Administration


 


Multivitamins  1 each  02/03/18 10:00  02/13/18 11:35





  Total B With C -  PO   1 each





  DAILY KEELY   Administration


 


Nystatin  1 applic  02/03/18 10:00  02/13/18 11:35





  Nystop Powder -  TP   Not Given





  DAILY KEELY   











ASSESSMENT/PLAN:





62 year old female with a hx of sacral decubitus, tardive diskinesia, ESRD on HD

, HTN, SVC syndrome/clot, iron-deficiency anemia, Type 2 diabetes, GERD, and 

epilepsy is admitted to the hospital for sepsis 2/2 sacral decubitus





#Sepsis 2/2 sacral ulcer: stable


-not currently on Abx for osteomyelitis


-consult ID appreciated


-will want to get Dr. Heredia to come and evaluate the sacral wound


-will likely need Tx with long term abx


-blood cultures negative


-wound cultures grew multiple organisms


-lactic acid normalized


-wound vac in place - patient likely returning to [facility] once medically stable.


-tylenol 650 PRN pain





#C. Diff Colitis: pt was positive for C diff


-continue flagyl 500 PO TID





#Tardive Dyskinesia: stable, was due to antipsychotic use - stopped 6 months ago


-appreciate neuro consultation





#ESRD: stable, on HD


-HD tomorrow


-appreciate nephrology recommendations





#Anemia: stable


-transfuse as necessary if hgb < 7





#Seizure Disorder: stable


-continue keppra 500 BID





#SVC syndrome/clot: stable


-continue eliquis 5mg BID





#FEN


-tube feed nepro, appreciate speech/swallow reccs (6pm-6am - 12 hours)


-dysphagia puree


-careful with repletion


-no standing fluids





#Prophylaxis


-On eliquis





#Disposition:


-possible DC to SNF





Visit type





- Emergency Visit


Emergency Visit: No





- New Patient


This patient is new to me today: No





- Critical Care


Critical Care patient: No

## 2018-02-13 NOTE — PN
Progress Note (short form)





- Note


Progress Note: 





awake and alert


feels well











 


 


 Vital Signs











 Period  Temp  Pulse  Resp  BP Sys/Brown  Pulse Ox


 


 Last 24 Hr  97.6 F-98.9 F    18-20  108-143/44-74  98








cor-rrr


lungs clear


abd soft,nt +GT


ext no edema


fistula in left arm





vac in place- to be changed today





 CBC, BMP





 02/13/18 07:45 





 02/13/18 07:45 

















 Microbiology





02/10/18 12:30   Blood - Peripheral Venous   Blood Culture - Preliminary


                            NO GROWTH OBTAINED AFTER 72 HOURS, INCUBATION TO 

CONTINUE


                            FOR 2 DAYS.


02/10/18 12:40   Blood - Peripheral Venous   Blood Culture - Preliminary


                            NO GROWTH OBTAINED AFTER 72 HOURS, INCUBATION TO 

CONTINUE


                            FOR 2 DAYS.


02/11/18 08:43   Stool   Clostridium difficile Antigen (HAMLET) - Final


02/11/18 08:43   Stool   Clostridium difficile Toxin Assay - Final


02/02/18 14:39   Blood - Peripheral Venous   Blood Culture - Final


                            NO GROWTH AFTER 5 DAYS INCUBATION


02/02/18 13:25   Blood - Peripheral Venous   Blood Culture - Final


                            NO GROWTH AFTER 5 DAYS INCUBATION


02/03/18 03:30   Wound   Gram Stain - Final


02/03/18 03:30   Wound   Wound Culture - Final


                            Escherichia Coli


                            Morganella Morganii


                            Strep Agalactiae Group B


                            Beta Hem Streptococcus Group G


                            Diphtheroid/Corynebacterium





 











a/p





ct scan with ?phlegmon-question of sacral osteo noted


if there is no exposed bone , would defer bone biopsy at this time


would treat with ancef at HD, 2/2/3 and po flagyl for 6 weeks


will need good nutrition and wound care f/u


have asked Dr Heredia to review ct scan





d/w with son at length at bedside





esrd/hd

## 2018-02-13 NOTE — PN
Progress Note, Physician


History of Present Illness: 





Pt seen and examined at bedside. She appears comfortable. 





- Current Medication List


Current Medications: 


Active Medications





Acetaminophen (Tylenol -)  650 mg PO Q4H PRN


   PRN Reason: FEVER


   Last Admin: 02/11/18 11:21 Dose:  650 mg


Amino Acids (Prosource No Carb Liquid Pkt)  30 ml PO BID@0800,1730 [facility]


   Last Admin: 02/13/18 09:00 Dose:  Not Given


Apixaban (Eliquis -)  5 mg PO BID [facility]


   Last Admin: 02/13/18 11:35 Dose:  5 mg


Artificial Tears (Artificial Tears)  1 drop OD BID [facility]


   Last Admin: 02/13/18 11:35 Dose:  Not Given


IV Flush (Picc Line Flush)  8 ml IVPUSH PRN PRN


   PRN Reason: Protocol


Insulin Aspart (Novolog Vial Sliding Scale -)  1 vial SQ TIDAC KEELY


   PRN Reason: Protocol


   Last Admin: 02/13/18 12:26 Dose:  Not Given


Lactobacillus Acidophilus (Bacid -)  1 tab GT BID [facility]


   Last Admin: 02/13/18 11:35 Dose:  1 tab


Levetiracetam (Keppra Oral Solution -)  500 mg GT BID [facility]


   Last Admin: 02/13/18 11:35 Dose:  500 mg


Metronidazole (Flagyl -)  500 mg PO TID [facility]


   Last Admin: 02/13/18 16:02 Dose:  500 mg


Multivitamins (Total B With C -)  1 each PO DAILY [facility]


   Last Admin: 02/13/18 11:35 Dose:  1 each


Nystatin (Nystop Powder -)  1 applic TP DAILY [facility]


   Last Admin: 02/13/18 11:35 Dose:  Not Given











- Objective


Vital Signs: 


 Vital Signs











Temperature  97.7 F   02/13/18 14:36


 


Pulse Rate  94 H  02/13/18 14:36


 


Respiratory Rate  20   02/13/18 14:36


 


Blood Pressure  113/50   02/13/18 14:36


 


O2 Sat by Pulse Oximetry (%)  98   02/12/18 21:00











Constitutional: Yes: Calm


Eyes: Yes: Conjunctiva Clear


HENT: Yes: Atraumatic


Cardiovascular: Yes: S1, S2


Respiratory: Yes: CTA Bilaterally


Gastrointestinal: Yes: Abdomen, Obese, Other (peg)


Genitourinary: Yes: Incontinence


Musculoskeletal: Yes: Muscle Weakness


Edema: No


Neurological: Yes: Pre-Existing Deficit


Labs: 


 CBC, BMP





 02/13/18 07:45 





 02/13/18 07:45 





 INR, PTT











INR  1.42  (0.82-1.09)  H  02/03/18  06:10    














Problem List





- Problems


(1) Decubital ulcer


Code(s): L89.90 - PRESSURE ULCER OF UNSPECIFIED SITE, UNSPECIFIED STAGE   





(2) ESRD (end stage renal disease)


Code(s): N18.6 - END STAGE RENAL DISEASE   





(3) Sepsis


Code(s): A41.9 - SEPSIS, UNSPECIFIED ORGANISM   





Assessment/Plan


 Current Medications











Generic Name Dose Route Start Last Admin





  Trade Name Freq  PRN Reason Stop Dose Admin


 


Acetaminophen  650 mg  02/02/18 19:22  02/11/18 11:21





  Tylenol -  PO   650 mg





  Q4H PRN   Administration





  FEVER   


 


Amino Acids  30 ml  02/09/18 17:30  02/13/18 09:00





  Prosource No Carb Liquid Pkt  PO   Not Given





  BID@0800,1730 KEELY   


 


Apixaban  5 mg  02/03/18 10:00  02/13/18 11:35





  Eliquis -  PO   5 mg





  BID KEELY   Administration


 


Artificial Tears  1 drop  02/03/18 10:00  02/13/18 11:35





  Artificial Tears  OD   Not Given





  BID KEELY   


 


IV Flush  8 ml  02/12/18 20:00  





  Picc Line Flush  IVPUSH   





  PRN PRN   





  Protocol   


 


Insulin Aspart  1 vial  02/03/18 07:00  02/13/18 12:26





  Novolog Vial Sliding Scale -  SQ   Not Given





  TIDAC KEELY   





  Protocol   


 


Lactobacillus Acidophilus  1 tab  02/03/18 10:00  02/13/18 11:35





  Bacid -  GT   1 tab





  BID KEELY   Administration


 


Levetiracetam  500 mg  02/02/18 23:00  02/13/18 11:35





  Keppra Oral Solution -  GT   500 mg





  BID KEELY   Administration


 


Metronidazole  500 mg  02/11/18 15:00  02/13/18 16:02





  Flagyl -  PO   500 mg





  TID KEELY   Administration


 


Multivitamins  1 each  02/03/18 10:00  02/13/18 11:35





  Total B With C -  PO   1 each





  DAILY KEELY   Administration


 


Nystatin  1 applic  02/03/18 10:00  02/13/18 11:35





  Nystop Powder -  TP   Not Given





  DAILY [facility]   





 Laboratory Tests











  02/13/18





  07:45


 


Calcium  9.6

















Impression


1. ESRD


2. anemia


3. change in mental status


4. epilepsy


5. htn


6. dm


7. gerd


8. obesity


9. hypercalcemia








Plan


- HD in am


- follow pth


- abx per ID


- wound care to ulcers


- epogen for anemia


- monitor hg


- will follow


Dr Young

## 2018-02-14 VITALS — SYSTOLIC BLOOD PRESSURE: 117 MMHG | TEMPERATURE: 98.7 F | DIASTOLIC BLOOD PRESSURE: 51 MMHG | HEART RATE: 95 BPM

## 2018-02-14 LAB
ANION GAP SERPL CALC-SCNC: 10 MMOL/L (ref 8–16)
BUN SERPL-MCNC: 49 MG/DL (ref 7–18)
CALCIUM SERPL-MCNC: 9.2 MG/DL (ref 8.5–10.1)
CHLORIDE SERPL-SCNC: 99 MMOL/L (ref 98–107)
CO2 SERPL-SCNC: 30 MMOL/L (ref 21–32)
CREAT SERPL-MCNC: 5.8 MG/DL (ref 0.55–1.02)
DEPRECATED RDW RBC AUTO: 18.2 % (ref 11.6–15.6)
GLUCOSE SERPL-MCNC: 223 MG/DL (ref 74–106)
HCT VFR BLD CALC: 27.9 % (ref 32.4–45.2)
HGB BLD-MCNC: 8.9 GM/DL (ref 10.7–15.3)
MAGNESIUM SERPL-MCNC: 2.1 MG/DL (ref 1.8–2.4)
MCH RBC QN AUTO: 28.5 PG (ref 25.7–33.7)
MCHC RBC AUTO-ENTMCNC: 31.8 G/DL (ref 32–36)
MCV RBC: 89.9 FL (ref 80–96)
PHOSPHATE SERPL-MCNC: 3.5 MG/DL (ref 2.5–4.9)
PLATELET # BLD AUTO: 574 K/MM3 (ref 134–434)
PMV BLD: 7.4 FL (ref 7.5–11.1)
POTASSIUM SERPLBLD-SCNC: 3.4 MMOL/L (ref 3.5–5.1)
RBC # BLD AUTO: 3.11 M/MM3 (ref 3.6–5.2)
SODIUM SERPL-SCNC: 139 MMOL/L (ref 136–145)
WBC # BLD AUTO: 9.2 K/MM3 (ref 4–10)

## 2018-02-14 PROCEDURE — 5A1D90Z PERFORMANCE OF URINARY FILTRATION, CONTINUOUS, GREATER THAN 18 HOURS PER DAY: ICD-10-PCS | Performed by: INTERNAL MEDICINE

## 2018-02-14 RX ADMIN — POLYVINYL ALCOHOL SCH DROP: 14 SOLUTION/ DROPS OPHTHALMIC at 10:34

## 2018-02-14 RX ADMIN — VITAMIN C SCH EACH: TAB at 10:34

## 2018-02-14 RX ADMIN — INSULIN ASPART SCH UNITS: 100 INJECTION, SOLUTION INTRAVENOUS; SUBCUTANEOUS at 06:41

## 2018-02-14 RX ADMIN — Medication SCH ML: at 16:34

## 2018-02-14 RX ADMIN — Medication SCH ML: at 10:34

## 2018-02-14 RX ADMIN — Medication SCH TAB: at 10:34

## 2018-02-14 RX ADMIN — NYSTATIN SCH APPLIC: 100000 POWDER TOPICAL at 10:34

## 2018-02-14 RX ADMIN — INSULIN ASPART SCH: 100 INJECTION, SOLUTION INTRAVENOUS; SUBCUTANEOUS at 16:39

## 2018-02-14 RX ADMIN — LEVETIRACETAM SCH MG: 100 SOLUTION ORAL at 10:33

## 2018-02-14 RX ADMIN — INSULIN ASPART SCH: 100 INJECTION, SOLUTION INTRAVENOUS; SUBCUTANEOUS at 11:06

## 2018-02-14 RX ADMIN — APIXABAN SCH MG: 5 TABLET, FILM COATED ORAL at 10:34

## 2018-02-14 NOTE — PN
Progress Note, Physician


History of Present Illness: 





Pt seen and examined at bedside. She tolerated HD today. Mental status is at 

baseline. 





- Current Medication List


Current Medications: 


Active Medications





Acetaminophen (Tylenol -)  650 mg PO Q4H PRN


   PRN Reason: FEVER


   Last Admin: 02/13/18 17:35 Dose:  650 mg


Amino Acids (Prosource No Carb Liquid Pkt)  30 ml PO BID@0800,1730 [facility]


   Last Admin: 02/14/18 10:34 Dose:  30 ml


Apixaban (Eliquis -)  5 mg PO BID [facility]


   Last Admin: 02/14/18 10:34 Dose:  5 mg


Artificial Tears (Artificial Tears)  1 drop OD BID [facility]


   Last Admin: 02/14/18 10:34 Dose:  1 drop


IV Flush (Picc Line Flush)  8 ml IVPUSH PRN PRN


   PRN Reason: Protocol


Insulin Aspart (Novolog Vial Sliding Scale -)  1 vial SQ TIDAC KEELY


   PRN Reason: Protocol


   Last Admin: 02/14/18 11:06 Dose:  Not Given


Lactobacillus Acidophilus (Bacid -)  1 tab GT BID [facility]


   Last Admin: 02/14/18 10:34 Dose:  1 tab


Levetiracetam (Keppra Oral Solution -)  500 mg GT BID [facility]


   Last Admin: 02/14/18 10:33 Dose:  500 mg


Metronidazole (Flagyl -)  500 mg PO TID [facility]


   Last Admin: 02/14/18 05:43 Dose:  500 mg


Multivitamins (Total B With C -)  1 each PO DAILY [facility]


   Last Admin: 02/14/18 10:34 Dose:  1 each


Nystatin (Nystop Powder -)  1 applic TP DAILY [facility]


   Last Admin: 02/14/18 10:34 Dose:  1 applic











- Objective


Vital Signs: 


 Vital Signs











Temperature  97.7 F   02/14/18 11:00


 


Pulse Rate  85   02/14/18 11:00


 


Respiratory Rate  20   02/14/18 11:00


 


Blood Pressure  112/48   02/14/18 11:00


 


O2 Sat by Pulse Oximetry (%)  96   02/14/18 09:00











Constitutional: Yes: Calm


Eyes: Yes: Conjunctiva Clear


HENT: Yes: Atraumatic


Neck: Yes: Supple


Cardiovascular: Yes: S1, S2


Respiratory: Yes: CTA Bilaterally


Gastrointestinal: Yes: Soft, Other (peg)


Genitourinary: Yes: Incontinence


Musculoskeletal: Yes: Muscle Weakness


Edema: LLE: Trace, RLE: Trace


Neurological: Yes: Pre-Existing Deficit


Labs: 


 CBC, BMP





 02/14/18 06:45 





 02/14/18 06:45 





 INR, PTT











INR  1.42  (0.82-1.09)  H  02/03/18  06:10    














Problem List





- Problems


(1) Decubital ulcer


Code(s): L89.90 - PRESSURE ULCER OF UNSPECIFIED SITE, UNSPECIFIED STAGE   





(2) ESRD (end stage renal disease)


Code(s): N18.6 - END STAGE RENAL DISEASE   





(3) Sepsis


Code(s): A41.9 - SEPSIS, UNSPECIFIED ORGANISM   





Assessment/Plan


 Current Medications











Generic Name Dose Route Start Last Admin





  Trade Name Freq  PRN Reason Stop Dose Admin


 


Acetaminophen  650 mg  02/02/18 19:22  02/13/18 17:35





  Tylenol -  PO   650 mg





  Q4H PRN   Administration





  FEVER   


 


Amino Acids  30 ml  02/09/18 17:30  02/14/18 10:34





  Prosource No Carb Liquid Pkt  PO   30 ml





  BID@0800,1730 KEELY   Administration


 


Apixaban  5 mg  02/03/18 10:00  02/14/18 10:34





  Eliquis -  PO   5 mg





  BID KEELY   Administration


 


Artificial Tears  1 drop  02/03/18 10:00  02/14/18 10:34





  Artificial Tears  OD   1 drop





  BID KEELY   Administration


 


IV Flush  8 ml  02/12/18 20:00  





  Picc Line Flush  IVPUSH   





  PRN PRN   





  Protocol   


 


Insulin Aspart  1 vial  02/03/18 07:00  02/14/18 11:06





  Novolog Vial Sliding Scale -  SQ   Not Given





  TIDAC KEELY   





  Protocol   


 


Lactobacillus Acidophilus  1 tab  02/03/18 10:00  02/14/18 10:34





  Bacid -  GT   1 tab





  BID KEELY   Administration


 


Levetiracetam  500 mg  02/02/18 23:00  02/14/18 10:33





  Keppra Oral Solution -  GT   500 mg





  BID KEELY   Administration


 


Metronidazole  500 mg  02/11/18 15:00  02/14/18 05:43





  Flagyl -  PO   500 mg





  TID KEELY   Administration


 


Multivitamins  1 each  02/03/18 10:00  02/14/18 10:34





  Total B With C -  PO   1 each





  DAILY KEELY   Administration


 


Nystatin  1 applic  02/03/18 10:00  02/14/18 10:34





  Nystop Powder -  TP   1 applic





  DAILY KEELY   Administration








 Laboratory Tests











  02/10/18 02/14/18





  07:55 06:45


 


Calcium   9.2


 


PTH Intact  215 H 





Impression


1. ESRD


2. anemia


3. change in mental status


4. epilepsy


5. htn


6. dm


7. gerd


8. obesity


9. hypercalcemia








Plan


- pt tolerated HD today


- calcium is improving


- zemplar stopped


- monitor pth as outpt


- abx per ID


- wound care to ulcers


- epogen for anemia


- monitor hg


- will follow


Dr Young

## 2018-02-14 NOTE — DS
Physical Exam: 


SUBJECTIVE: Patient seen and examined at bedside. No acute changes overnight. 

Vital signs stable today. Patient is s/p hemodialysis today.








OBJECTIVE:





 Vital Signs











 Period  Temp  Pulse  Resp  BP Sys/Brown  Pulse Ox


 


 Last 24 Hr  97.5 F-98.8 F  81-97  18-20  /44-76  96-98








PHYSICAL EXAM





GENERAL: The patient is awake, alert, and fully oriented, in no acute distress.


HEAD: Normal with no signs of trauma.


LUNGS: Breath sounds equal, clear to auscultation bilaterally, no wheezes, no 

crackles, no accessory muscle use. 


HEART: 3/6 holosystolic murmur, S1, S2 without murmur, rub or gallop.


ABDOMEN: Obese, Soft, nontender, nondistended, normoactive bowel sounds, no 

guarding, no rebound, no hepatosplenomegaly, no masses.


EXTREMITIES: 2+ pulses, warm, well-perfused, no edema. Surgical scars noted on 

R arm representative of previous graft/fistula. large stage 4 Sacral decubitus 

- wound vac in place


NEUROLOGICAL: Cranial nerves II through XII grossly intact. Lip smacking 

observed


PSYCH: Normal mood, normal affect.


SKIN: Warm, dry, normal turgor, no rashes or lesions noted





LABS


 Laboratory Results - last 24 hr











  02/13/18 02/14/18 02/14/18





  17:51 05:43 06:45


 


WBC   


 


RBC   


 


Hgb   


 


Hct   


 


MCV   


 


MCH   


 


MCHC   


 


RDW   


 


Plt Count   


 


MPV   


 


ESR    106 H


 


Sodium   


 


Potassium   


 


Chloride   


 


Carbon Dioxide   


 


Anion Gap   


 


BUN   


 


Creatinine   


 


POC Glucometer  157  172 


 


Random Glucose   


 


Calcium   


 


Phosphorus   


 


Magnesium   














  02/14/18 02/14/18 02/14/18





  06:45 06:45 06:45


 


WBC    9.2


 


RBC    3.11 L


 


Hgb    8.9 L


 


Hct    27.9 L


 


MCV    89.9


 


MCH    28.5


 


MCHC    31.8 L


 


RDW    18.2 H


 


Plt Count    574 H


 


MPV    7.4 L


 


ESR   


 


Sodium   139 


 


Potassium   3.4 L 


 


Chloride   99 


 


Carbon Dioxide   30 


 


Anion Gap   10 


 


BUN   49 H 


 


Creatinine   5.8 H 


 


POC Glucometer   


 


Random Glucose   223 H 


 


Calcium   9.2 


 


Phosphorus  3.5  


 


Magnesium  2.1  











Microbiology





02/10/18 12:30   Blood - Peripheral Venous   Blood Culture - Preliminary


                            NO GROWTH OBTAINED AFTER 96 HOURS, INCUBATION TO 

CONTINUE


                            FOR 1 DAYS.


02/10/18 12:40   Blood - Peripheral Venous   Blood Culture - Preliminary


                            NO GROWTH OBTAINED AFTER 96 HOURS, INCUBATION TO 

CONTINUE


                            FOR 1 DAYS.


02/11/18 08:43   Stool   Clostridium difficile Antigen (HAMLET) - Final - POSITIVE


02/11/18 08:43   Stool   Clostridium difficile Toxin Assay - Final


02/02/18 14:39   Blood - Peripheral Venous   Blood Culture - Final


                            NO GROWTH AFTER 5 DAYS INCUBATION


02/02/18 13:25   Blood - Peripheral Venous   Blood Culture - Final


                            NO GROWTH AFTER 5 DAYS INCUBATION


02/03/18 03:30   Wound   Gram Stain - Final


02/03/18 03:30   Wound   Wound Culture - Final


                            Escherichia Coli


                            Morganella Morganii


                            Strep Agalactiae Group B


                            Beta Hem Streptococcus Group G


                            Diphtheroid/Corynebacterium





IMAGING


CXR 2/2: Imaging reveals a weak inspiration with prominent mediastinum, chin 

artifact and a left axillary vascular stent. Correlation recommended. Are no 

prior studies for comparison.    


HEAD CT 2/2: Moderate atrophy. No gross evidence of a focal intracranial lesion 

or hemorrhage is seen. 


X RAY Sacrum 2/6: No gross bone destruction. However, correlation with MRI of 

the sacrum is a study of choice to rule out osteomyelitis in view of the 

clinical history. 5.6 cm heterogeneously calcified mass in the mid pelvis 

compatible with a calcified fibroid. 


CXR 2/10: Extensive bilateral vascular stent in. No acute chest pathology. 


CT Pelvis 2/12: 1. Deep sacral ulcer extending towards the bone. Ill-defined 

fluid within bilateral parasagittal soft tissues directly adjacent to the 

coccyx is presumably phlegmon. No evidence of abscess/ drainable collection at 

this time. 2. Focal area of osseous demineralization with indistinct cortex in 

the underlying posterior sacrum suggests osteomyelitis. 








HOSPITAL COURSE:





Date of Admission:02/02/18





62 year old female with a past medical history of ESRD MWF, HTN, SVC syndrome 

on eliquis, hemorrhage, iron-deficiency anemia, Type 2 diabetes, enterocolitis d

/t Clostridium difficile, GERD, and epilepsy who presented to the emergency 

department after rapid response from wound care today. The son provided history 

that patient had increased neurologic facial movements and noted that the 

patient had an altered mental status unlike the day prior. Patient had a hx of 

schizophrenia and stopped her psych medications 6 months ago. Patient has a 

large sacral decubitus ulcer stage 4 and a large posterior R thigh ulcer. 

Patient had a mild leukocytosis and tachycardia. She was admitted for the 

treatment of sepsis secondary to stage 4 sacral ulcer.





Patient was put on vanc/zosyn by the ED and continued by ID for a couple of 

days. Patient was later switched to ceftriaxone/flagyl for coverage by ID. 

Patient's wound cultures grew 5 organisms as listed above. Her clinical picture 

improved, her white count stabilized and she remained afebrile. Her antibiotics 

were subsequently stopped.





We had surgery consulted, who thought it best to place a wound vac on the 

sacrum to facilitate the healing process. The wound vac dressing needed to be 

changed once every 3 days. The wound vac dressing would need to be changed 

every Monday, Wednesday, Friday whenever she arrives back at her skilled 

nursing home.





Patient's dietary needs were assessed. It was determined that she would get 

tube feeds from 6pm-6am at a rate of 45cc/hr with 30cc water flushes and 

supplemental dysphagia puree diet with nectar liquid consistency. Consider 

calorie count at the nursing home to determine optimal nutrition.





Patient's mental status improved back to baseline. She received an X ray of the 

sacrum to rule out osteomyelitis, which did not reveal any gross bone 

destruction.  Patient was unable to get an MRI of the sacrum because it was 

unclear the type of stents she received in the past. Instead, a CT sacrum was 

performed with contrast (before dialysis) which documented bony destruction 

suggestive of osteomyelitis. ID recommended patient be put on 6 weeks of IV 

ancef to be given at a 2/2/3 dosing regimen directly after her dialysis 

treatments.





Patient had an episode of diarrhea a couple of days before discharge. She 

tested positive for C diff antigen but negative for C diff toxin. ID 

recommended that in addition to ancef for osteomyelitis that we add oral flagyl 

for 6 weeks.





Patient's clinical condition was discussed at length with family throughout 

hospitalization and before discharge. Patient was discharged on 2/14/18 with 

instructions to complete 6 weeks of IV ancef at 2/2/3 dosing after each 

dialysis session, oral flagyl for C diff, instructions for tube feedings as 

well as supplemental PO intake, and referrals for wound care, infectious 

diseases, nephrology, and primary care.





Date of Discharge: 02/14/18











Minutes to complete discharge: 35





<Donta Butterfield - Last Filed: 02/14/18 16:29>


Physical Exam: 


Patient is seen and examined , agree the plan. Plan back to Rehab. And to 

continue IV antibiotics for 6 weeks, discussed with ID. 





<Lisset Lowe - Last Filed: 02/14/18 18:38>





Discharge Summary


Reason For Visit: SEPSIS


Current Active Problems





Decubital ulcer (Acute)


ESRD (end stage renal disease) (Acute)


Sepsis (Acute)











- Home Medications


Comprehensive Discharge Medication List: 


Ambulatory Orders





Acetaminophen 650 mg GT Q6H PRN 02/02/18 


Amlodipine Besylate [Norvasc -] 2.5 mg GT DAILY 02/02/18 


Apixaban [Eliquis] 5 mg GT BID 02/02/18 


Collagenase Clostridium Hist. [Santyl -] 1 applic TP DAILY 02/02/18 


Insulin (LOG) Aspart [NovoLOG -] 0 units SQ BID 02/02/18 


Insulin NPH Hum/Reg Insulin Hm [Novolin 70-30 100 Unit/ml Vial] 8 unit SQ DAILY 

02/02/18 


Ipratropium 0.02% Nebulizer [Atrovent 0.02% Nebulizer -] 1 neb NEB Q4H 02/02/18 


Lactobacillus Acidophilus [Acidophilus] 1 each GT BID 02/02/18 


Nystatin Powder [Nystop Powder -] 60 gm TP DAILY 02/02/18 


Paricalcitol 2 mcg IV DAILY 02/02/18 


Polyvinyl Alcohol [Artificial Tears] 15 ml OP BID 02/02/18 


Sevelamer Carbonate 800 mg GT Q8H 02/02/18 


Vitamin B Complex/Folic Acid [B-Stress Capsules] 2,000 mcg GT DAILY 02/02/18 


levETIRAcetam [Keppra Oral Solution -] 500 mg GT Q12H 02/02/18 


Cefazolin 2 gm/D5w [Ancef 2 gm Premixed Ivpb -] 2 gm IV DAILY #12 ml 02/14/18 


Cefazolin 2 gm/D5w [Ancef 2 gm Premixed Ivpb -] 3 gm IV DAILY #6 ml 02/14/18 


metroNIDAZOLE [Flagyl -] 500 mg PO TID 42 Days  tablet 02/14/18 











<Donta Butterfield - Last Filed: 02/14/18 16:29>


Current Active Problems





Decubital ulcer (Acute)


ESRD (end stage renal disease) (Acute)


Sepsis (Acute)











- Home Medications


Comprehensive Discharge Medication List: 


Ambulatory Orders





Acetaminophen 650 mg GT Q6H PRN 02/02/18 


Amlodipine Besylate [Norvasc -] 2.5 mg GT DAILY 02/02/18 


Apixaban [Eliquis] 5 mg GT BID 02/02/18 


Collagenase Clostridium Hist. [Santyl -] 1 applic TP DAILY 02/02/18 


Insulin (LOG) Aspart [NovoLOG -] 0 units SQ BID 02/02/18 


Insulin NPH Hum/Reg Insulin Hm [Novolin 70-30 100 Unit/ml Vial] 8 unit SQ DAILY 

02/02/18 


Ipratropium 0.02% Nebulizer [Atrovent 0.02% Nebulizer -] 1 neb NEB Q4H 02/02/18 


Lactobacillus Acidophilus [Acidophilus] 1 each GT BID 02/02/18 


Nystatin Powder [Nystop Powder -] 60 gm TP DAILY 02/02/18 


Paricalcitol 2 mcg IV DAILY 02/02/18 


Polyvinyl Alcohol [Artificial Tears] 15 ml OP BID 02/02/18 


Sevelamer Carbonate 800 mg GT Q8H 02/02/18 


Vitamin B Complex/Folic Acid [B-Stress Capsules] 2,000 mcg GT DAILY 02/02/18 


levETIRAcetam [Keppra Oral Solution -] 500 mg GT Q12H 02/02/18 


Cefazolin 2 gm/D5w [Ancef 2 gm Premixed Ivpb -] 2 gm IV DAILY #12 ml 02/14/18 


Cefazolin 2 gm/D5w [Ancef 2 gm Premixed Ivpb -] 3 gm IV DAILY #6 ml 02/14/18 


metroNIDAZOLE [Flagyl -] 500 mg PO TID 42 Days  tablet 02/14/18 











<Lisset Lowe - Last Filed: 02/14/18 18:38>


Condition: Improved





- Instructions


Diet, Activity, Other Instructions: 


You were treated in the hospital for sepsis due to sacral decubitus ulcer.


We treated you with antibiotics in the hospital.


You developed c. diff diarrhea which requires you to be on 6 weeks of 

outpatient antibiotics.


We found that you have osteomyelitis of the sacrum on CT scan


You will need to be on antibiotics as an outpatient for treatment of 

osteomyelitis.





MEDICAL RECOMMENDATIONS:


-Continue hemodialysis Monday/Wednesday/Friday


-You will need to have your wound vac changed three times a week, likely Monday

, Wednesday, and Friday


-Please follow with Dr. Heredia in the wound care clinic at [facility]





DIETARY RECOMMENDATIONS:


-Continue tube feeds nightly between 6pm-6am (12 hours) with Nepro 1.5 at 45cc/

hr with 30cc water flushes


-Supplement during the day with a dysphagia puree diet with nectar consistency 

liquids


-Can try to wean from tube feeding after infection treatment has completed





ANTIBIOTIC THERAPY - VERY IMPORTANT:


-You need to get Ancef 2gm IV AFTER dialysis on Mondays and Wednesdays for 

osteomyelitis for 6 weeks


-You need to get Ancef 3gm IV AFTER dialysis on Fridays for osteomyelitis for 6 

weeks


-You need to get Flagyl 500 Oral three times a day for 6 weeks


-Continue all of your home medications as before





REFERRALS:


-Please make an appointment with your primary care physician within 1 week of 

discharge


-Please make an appointment with your nephrologist Dr. Bedolla within 1 week of 

discharge


-Please make an appointment with the infection doctor Dr. Glass within 2 

weeks of discharge


-Please make an appointment with vascular surgeon Dr. Heredia within 1 week for 

wound care appointments


Referrals: 


Maria G Glass MD [Staff Physician] - 2 Weeks


Jony Heredia MD [Staff Physician] - 1 Week


Jason Bedolla MD [Staff Physician] - 1 Week


Disposition: SKILLED NURSING FACILITY


This patient is new to me today: No


Emergency Visit: No


Critical Care patient: No





- Discharge Referral


Referred to [facility] No


Physician Referral: Jony Heredia DO ([facility])





<Donta Butterfield - Last Filed: 02/14/18 16:29>

## 2018-02-14 NOTE — PN
Teaching Attending Note


Name of Resident: Donta Butterfield





ATTENDING PHYSICIAN STATEMENT





I saw and evaluated the patient.


I reviewed the resident's note and discussed the case with the resident.


I agree with the resident's findings and plan as documented.








SUBJECTIVE:








OBJECTIVE:


 Vital Signs











Temperature  97.7 F   02/14/18 11:00


 


Pulse Rate  85   02/14/18 11:00


 


Respiratory Rate  20   02/14/18 11:00


 


Blood Pressure  112/48   02/14/18 11:00


 


O2 Sat by Pulse Oximetry (%)  96   02/14/18 09:00








 CBCD











WBC  9.2 K/mm3 (4.0-10.0)   02/14/18  06:45    


 


RBC  3.11 M/mm3 (3.60-5.2)  L  02/14/18  06:45    


 


Hgb  8.9 GM/dL (10.7-15.3)  L  02/14/18  06:45    


 


Hct  27.9 % (32.4-45.2)  L  02/14/18  06:45    


 


MCV  89.9 fl (80-96)   02/14/18  06:45    


 


MCHC  31.8 g/dl (32.0-36.0)  L  02/14/18  06:45    


 


RDW  18.2 % (11.6-15.6)  H  02/14/18  06:45    


 


Plt Count  574 K/MM3 (134-434)  H  02/14/18  06:45    


 


MPV  7.4 fl (7.5-11.1)  L  02/14/18  06:45    








 CMP











Sodium  139 mmol/L (136-145)   02/14/18  06:45    


 


Potassium  3.4 mmol/L (3.5-5.1)  L  02/14/18  06:45    


 


Chloride  99 mmol/L ()   02/14/18  06:45    


 


Carbon Dioxide  30 mmol/L (21-32)   02/14/18  06:45    


 


Anion Gap  10  (8-16)   02/14/18  06:45    


 


BUN  49 mg/dL (7-18)  H  02/14/18  06:45    


 


Creatinine  5.8 mg/dL (0.55-1.02)  H  02/14/18  06:45    


 


Creat Clearance w eGFR  7.39  (>60)   02/11/18  06:00    


 


Random Glucose  223 mg/dL ()  H  02/14/18  06:45    


 


Calcium  9.2 mg/dL (8.5-10.1)   02/14/18  06:45    


 


Total Bilirubin  0.4 mg/dL (0.2-1.0)  D 02/11/18  06:00    


 


AST  22 U/L (15-37)   02/11/18  06:00    


 


ALT  30 U/L (12-78)   02/11/18  06:00    


 


Alkaline Phosphatase  140 U/L ()  H  02/11/18  06:00    


 


Total Protein  7.1 g/dl (6.4-8.2)   02/11/18  06:00    


 


Albumin  2.7 g/dl (3.4-5.0)  L  02/11/18  06:00    








 CARDIAC ENZYMES











Creatine Kinase  448 IU/L ()  H  02/02/18  14:39    


 


Troponin I  < 0.02 ng/ml (0.00-0.05)   02/02/18  14:39    








 Current Medications











Generic Name Dose Route Start Last Admin





  Trade Name Freq  PRN Reason Stop Dose Admin


 


Acetaminophen  650 mg  02/02/18 19:22  02/13/18 17:35





  Tylenol -  PO   650 mg





  Q4H PRN   Administration





  FEVER   


 


Amino Acids  30 ml  02/09/18 17:30  02/14/18 10:34





  Prosource No Carb Liquid Pkt  PO   30 ml





  BID@0800,1730 KEELY   Administration


 


Apixaban  5 mg  02/03/18 10:00  02/14/18 10:34





  Eliquis -  PO   5 mg





  BID KEELY   Administration


 


Artificial Tears  1 drop  02/03/18 10:00  02/14/18 10:34





  Artificial Tears  OD   1 drop





  BID KEELY   Administration


 


IV Flush  8 ml  02/12/18 20:00  





  Picc Line Flush  IVPUSH   





  PRN PRN   





  Protocol   


 


Insulin Aspart  1 vial  02/03/18 07:00  02/14/18 11:06





  Novolog Vial Sliding Scale -  SQ   Not Given





  [facility]   





  Protocol   


 


Lactobacillus Acidophilus  1 tab  02/03/18 10:00  02/14/18 10:34





  Bacid -  GT   1 tab





  BID KEELY   Administration


 


Levetiracetam  500 mg  02/02/18 23:00  02/14/18 10:33





  Keppra Oral Solution -  GT   500 mg





  BID KEELY   Administration


 


Metronidazole  500 mg  02/11/18 15:00  02/14/18 13:45





  Flagyl -  PO   500 mg





  TID KEELY   Administration


 


Multivitamins  1 each  02/03/18 10:00  02/14/18 10:34





  Total B With C -  PO   1 each





  DAILY KEELY   Administration


 


Nystatin  1 applic  02/03/18 10:00  02/14/18 10:34





  Nystop Powder -  TP   1 applic





  DAILY KEELY   Administration








 Home Medications











 Medication  Instructions  Recorded


 


Acetaminophen 650 mg GT Q6H PRN 02/02/18


 


Amlodipine Besylate [Norvasc -] 2.5 mg GT DAILY 02/02/18


 


Apixaban [Eliquis] 5 mg GT BID 02/02/18


 


Collagenase Clostridium Hist. 1 applic TP DAILY 02/02/18





[Santyl -]  


 


Insulin (LOG) Aspart [NovoLOG -] 0 units SQ BID 02/02/18


 


Insulin NPH Hum/Reg Insulin Hm 8 unit SQ DAILY 02/02/18





[Novolin 70-30 100 Unit/ml Vial]  


 


Ipratropium 0.02% Nebulizer 1 neb NEB Q4H 02/02/18





[Atrovent 0.02% Nebulizer -]  


 


Lactobacillus Acidophilus 1 each GT BID 02/02/18





[Acidophilus]  


 


Nystatin Powder [Nystop Powder -] 60 gm TP DAILY 02/02/18


 


Paricalcitol 2 mcg IV DAILY 02/02/18


 


Polyvinyl Alcohol [Artificial 15 ml OP BID 02/02/18





Tears]  


 


Sevelamer Carbonate 800 mg GT Q8H 02/02/18


 


Vitamin B Complex/Folic Acid 2,000 mcg GT DAILY 02/02/18





[B-Stress Capsules]  


 


levETIRAcetam [Keppra Oral 500 mg GT Q12H 02/02/18





Solution -]  


 


Cefazolin 2 gm/D5w [Ancef 2 gm 2 gm IV DAILY #12 ml 02/14/18





Premixed Ivpb -]  


 


Cefazolin 2 gm/D5w [Ancef 2 gm 3 gm IV DAILY #6 ml 02/14/18





Premixed Ivpb -]  


 


metroNIDAZOLE [Flagyl -] 500 mg PO TID 42 Days  tablet 02/14/18














ASSESSMENT AND PLAN:

## 2018-05-10 ENCOUNTER — HOSPITAL ENCOUNTER (INPATIENT)
Dept: HOSPITAL 74 - JER | Age: 63
DRG: 871 | End: 2018-05-10
Attending: INTERNAL MEDICINE | Admitting: INTERNAL MEDICINE
Payer: COMMERCIAL

## 2018-05-10 VITALS — BODY MASS INDEX: 25.9 KG/M2

## 2018-05-10 VITALS — DIASTOLIC BLOOD PRESSURE: 70 MMHG | SYSTOLIC BLOOD PRESSURE: 82 MMHG | HEART RATE: 92 BPM

## 2018-05-10 VITALS — TEMPERATURE: 99.8 F

## 2018-05-10 DIAGNOSIS — I46.9: ICD-10-CM

## 2018-05-10 DIAGNOSIS — Z66: ICD-10-CM

## 2018-05-10 DIAGNOSIS — D64.9: ICD-10-CM

## 2018-05-10 DIAGNOSIS — Z86.711: ICD-10-CM

## 2018-05-10 DIAGNOSIS — Z99.2: ICD-10-CM

## 2018-05-10 DIAGNOSIS — K21.9: ICD-10-CM

## 2018-05-10 DIAGNOSIS — I12.0: ICD-10-CM

## 2018-05-10 DIAGNOSIS — Z86.73: ICD-10-CM

## 2018-05-10 DIAGNOSIS — A41.9: Primary | ICD-10-CM

## 2018-05-10 DIAGNOSIS — G40.909: ICD-10-CM

## 2018-05-10 DIAGNOSIS — R65.21: ICD-10-CM

## 2018-05-10 DIAGNOSIS — Z93.1: ICD-10-CM

## 2018-05-10 DIAGNOSIS — Z86.718: ICD-10-CM

## 2018-05-10 DIAGNOSIS — M86.8X8: ICD-10-CM

## 2018-05-10 DIAGNOSIS — E11.22: ICD-10-CM

## 2018-05-10 DIAGNOSIS — F20.9: ICD-10-CM

## 2018-05-10 DIAGNOSIS — E66.8: ICD-10-CM

## 2018-05-10 DIAGNOSIS — N18.6: ICD-10-CM

## 2018-05-10 DIAGNOSIS — L89.154: ICD-10-CM

## 2018-05-10 DIAGNOSIS — J96.01: ICD-10-CM

## 2018-05-10 LAB
ALBUMIN SERPL-MCNC: 1.8 G/DL (ref 3.4–5)
ALP SERPL-CCNC: 116 U/L (ref 45–117)
ALT SERPL-CCNC: 893 U/L (ref 12–78)
ANION GAP SERPL CALC-SCNC: 35 MMOL/L (ref 8–16)
APTT BLD: 57.8 SECONDS (ref 26.9–34.4)
AST SERPL-CCNC: 1694 U/L (ref 15–37)
BILIRUB SERPL-MCNC: 0.9 MG/DL (ref 0.2–1)
BUN SERPL-MCNC: 32 MG/DL (ref 7–18)
CALCIUM SERPL-MCNC: 10.9 MG/DL (ref 8.5–10.1)
CHLORIDE SERPL-SCNC: 86 MMOL/L (ref 98–107)
CO2 SERPL-SCNC: 10 MMOL/L (ref 21–32)
CREAT SERPL-MCNC: 5 MG/DL (ref 0.55–1.02)
GLUCOSE SERPL-MCNC: 572 MG/DL (ref 74–106)
INR BLD: 1.78 (ref 0.82–1.09)
POTASSIUM SERPLBLD-SCNC: 4.8 MMOL/L (ref 3.5–5.1)
PROT SERPL-MCNC: 6.1 G/DL (ref 6.4–8.2)
PT PNL PPP: 20.1 SEC (ref 9.7–13)
SODIUM SERPL-SCNC: 131 MMOL/L (ref 136–145)

## 2018-05-10 PROCEDURE — 5A1935Z RESPIRATORY VENTILATION, LESS THAN 24 CONSECUTIVE HOURS: ICD-10-PCS | Performed by: INTERNAL MEDICINE

## 2018-05-10 NOTE — EKG
Test Reason : 

Blood Pressure : ***/*** mmHG

Vent. Rate : 086 BPM     Atrial Rate : 086 BPM

   P-R Int : 178 ms          QRS Dur : 072 ms

    QT Int : 358 ms       P-R-T Axes : 015 -02 082 degrees

   QTc Int : 428 ms

 

NORMAL SINUS RHYTHM

NONSPECIFIC ST AND T WAVE ABNORMALITY

ABNORMAL ECG

WHEN COMPARED WITH ECG OF 03-FEB-2018 11:27,

INVERTED T WAVES HAVE REPLACED NONSPECIFIC T WAVE ABNORMALITY IN 

LATERAL LEADS

QT HAS SHORTENED

Confirmed by LISANDRA THOMPSON, BARBI ([id number]) on 5/10/2018 11:42:01 AM

 

Referred By:             Confirmed By:BARBI FRY MD

## 2018-05-10 NOTE — PDOC
History of Present Illness





- History of Present Illness


Initial Comments: 





05/10/18 16:06


Patient is a 62 year old female, From [facility], with a significant past 

medical history of  hypertension, DVT, end-stage kidney disease on dialysis, 

pulmonary embolism, anemia, GERD, seizure disorder, stage IV sacral ulcer, 

osteomyelitis brought in by EMS to the ED with complaints of difficulty 

breathing.  As per EMS patient was experiencing episodes of difficulty 

breathing while at NH, promoting staff to call EMS to be brought into the ED 

for further evaluation.  EMS reports patient was experiencing agonal breathing 

on arrival and intubated. They reports patient was administered 1 mg of 

atropine while en route to the ED. As per EMS patients blood glucose level was 

found to be 139.  








Allergies: None


Social history: Lives in [facility]. No smoking. No alcohol. No 

illicit drugs. 


Surgical history: Bilateral Arm shunts. 


PMD: Dr. Rojas, Dr. Avtar López








<Malik De Guzman - Last Filed: 05/10/18 16:06>





- General


History Source: EMS, Family, Old Records


Exam Limitations: Clinical Condition, Intubated





<Efrain Felix - Last Filed: 05/10/18 18:20>





- General


Chief Complaint: Respiratory Arrest


Stated Complaint: RESPIRTORY DISTRESS


Time Seen by Provider: 05/10/18 10:09





Past History





<Malik De Guzman - Last Filed: 05/10/18 16:06>





- Past Medical History


Anemia: Yes


COPD: No


Diabetes: Yes


Dialysis: Yes (ESRD)


 Disorders: Yes


HTN: Yes


Psychiatric Problems: Yes (schizophrenia)


Seizures: Yes





- Suicide/Smoking/Psychosocial Hx


Smoking History: Unknown if ever smoked





<Efrain Felix - Last Filed: 05/10/18 18:20>





- Past Medical History


Allergies/Adverse Reactions: 


 Allergies











Allergy/AdvReac Type Severity Reaction Status Date / Time


 


No Known Allergies Allergy   Verified 05/10/18 11:00











Home Medications: 


Ambulatory Orders





Acetaminophen 650 mg GT Q6H PRN 02/02/18 


Amlodipine Besylate [Norvasc -] 2.5 mg GT DAILY 02/02/18 


Apixaban [Eliquis] 5 mg GT BID 02/02/18 


Collagenase Clostridium Hist. [Santyl -] 1 applic TP DAILY 02/02/18 


Insulin (LOG) Aspart [NovoLOG -] 0 units SQ BID 02/02/18 


Insulin NPH Hum/Reg Insulin Hm [Novolin 70-30 100 Unit/ml Vial] 8 unit SQ DAILY 

02/02/18 


Ipratropium 0.02% Nebulizer [Atrovent 0.02% Nebulizer -] 1 neb NEB Q4H 02/02/18 


Lactobacillus Acidophilus [Acidophilus] 1 each GT BID 02/02/18 


Nystatin Powder [Nystop Powder -] 60 gm TP DAILY 02/02/18 


Paricalcitol 2 mcg IV DAILY 02/02/18 


Polyvinyl Alcohol [Artificial Tears] 15 ml OP BID 02/02/18 


Sevelamer Carbonate 800 mg GT Q8H 02/02/18 


Vitamin B Complex/Folic Acid [B-Stress Capsules] 2,000 mcg GT DAILY 02/02/18 


levETIRAcetam [Keppra Oral Solution -] 500 mg GT Q12H 02/02/18 


Cefazolin 2 gm/D5w [Ancef 2 gm Premixed Ivpb -] 2 gm IV DAILY #12 ml 02/14/18 


Cefazolin 2 gm/D5w [Ancef 2 gm Premixed Ivpb -] 3 gm IV DAILY #6 ml 02/14/18 


metroNIDAZOLE [Flagyl -] 500 mg PO TID 42 Days  tablet 02/14/18 











**Review of Systems





- Review of Systems


Able to Perform ROS?: No


Comments:: 





05/10/18 16:06


Unable to Obtain. 





<Malik De Guzman - Last Filed: 05/10/18 16:06>





*Physical Exam





- Vital Signs


 Last Vital Signs











Temp Pulse Resp BP Pulse Ox


 


 99.8 F H  92 H  20   82/70    


 


 05/10/18 11:50  05/10/18 13:53  05/10/18 13:35  05/10/18 13:53   














- Physical Exam


Comments: 





05/10/18 16:06


GENERAL: +unresponsive, +ill-appearing


HEAD: No signs of trauma.


ENT: +ET tube in place.


LUNGS: +Rhonchorous bilaterally.


HEART: +Tachycardic


normal S1 and S2, no murmurs, rubs or gallops


ABDOMEN: +Soft. +yellow secretions from G tube. No erythema. 


EXTREMITIES: +RIght lower extremity proximal IO


NEUROLOGICAL: +AAOx0. 


SKIN: Warm, Dry, normal turgor, no rashes or lesions noted.








<Malik De Guzman - Last Filed: 05/10/18 16:06>





- Vital Signs


 Last Vital Signs











Temp Pulse Resp BP Pulse Ox


 


 102.5 F H     21   00/00    


 


 05/10/18 10:40     05/10/18 10:15  05/10/18 10:40   














<Efrain Felix - Last Filed: 05/10/18 18:20>





Procedures





- Additional Procedures


Progress: 





05/10/18 12:10


Yellow tip IO placed in left proximal tibia and left proximal humerus. Site 

cleansed with chlorhexidine prior to insertion of IO








<Efrain Felix - Last Filed: 05/10/18 18:20>





**Heart Score/ECG Review


  ** #1


ECG reviewed & interpreted by me at: 10:10





05/10/18 12:05


NSR 86, nonspecific ST and T wave abnormality.  msec





<Efrain Felix - Last Filed: 05/10/18 18:20>





ED Treatment Course





- LABORATORY


CBC & Chemistry Diagram: 


 05/10/18 11:10





 05/10/18 11:10





- ADDITIONAL ORDERS


Additional order review: 


 Laboratory  Results











  05/10/18 05/10/18 05/10/18





  11:10 11:10 11:10


 


PT with INR   


 


INR   


 


PTT (Actin FS)   


 


Sodium    131 L


 


Potassium    4.8


 


Chloride    86 L


 


Carbon Dioxide    10 L


 


Anion Gap    35 H


 


BUN    32 H


 


Creatinine    5.0 H


 


Creat Clearance w eGFR    8.77


 


Random Glucose    572 H*


 


Lactic Acid   22.7 H* 


 


Calcium    10.9 H


 


Total Bilirubin    0.9  D


 


AST    1694 H


 


ALT    893 H


 


Alkaline Phosphatase    116


 


Troponin I  0.03  


 


Total Protein    6.1 L


 


Albumin    1.8 L














  05/10/18





  11:10


 


PT with INR  20.10 H


 


INR  1.78 H


 


PTT (Actin FS)  57.8 H D


 


Sodium 


 


Potassium 


 


Chloride 


 


Carbon Dioxide 


 


Anion Gap 


 


BUN 


 


Creatinine 


 


Creat Clearance w eGFR 


 


Random Glucose 


 


Lactic Acid 


 


Calcium 


 


Total Bilirubin 


 


AST 


 


ALT 


 


Alkaline Phosphatase 


 


Troponin I 


 


Total Protein 


 


Albumin 








 











  05/10/18





  11:10


 


RBC  Cancelled


 


MCV  Cancelled


 


MCHC  Cancelled


 


RDW  Cancelled


 


MPV  Cancelled


 


Neutrophils %  Cancelled


 


Lymphocytes %  Cancelled


 


Monocytes %  Cancelled


 


Eosinophils %  Cancelled


 


Basophils %  Cancelled














- Medications


Given in the ED: 


ED Medications














Discontinued Medications














Generic Name Dose Route Start Last Admin





  Trade Name Freq  PRN Reason Stop Dose Admin


 


Acetaminophen  1,000 mg  05/10/18 12:12  05/10/18 11:00





  Ofirmev Injection -  IVPB  05/10/18 12:13  1,000 mg





  ONCE ONE   Administration


 


Hydrocortisone Sodium Succinate  100 mg  05/10/18 12:12  05/10/18 11:50





  Solu-Cortef -  IVPB  05/10/18 12:13  100 mg





  ONCE ONE   Administration


 


Vancomycin HCl 1,000 mg/  250 mls @ 166.667 mls/hr  05/10/18 10:11  05/10/18 11:

30





  Dextrose  IVPB  05/10/18 11:40  166.667 mls/hr





  ONCE ONE   Administration





  Protocol   


 


Piperacillin Sod/Tazobactam  50 mls @ 100 mls/hr  05/10/18 10:11  05/10/18 10:40





  Sod 3.375 gm/ Dextrose  IVPB  05/10/18 10:40  100 mls/hr





  ONCE ONE   Administration





  Protocol   


 


Pantoprazole Sodium  80 mg  05/10/18 10:17  05/10/18 12:32





  Protonix Iv  IVPUSH  05/10/18 10:18  80 mg





  ONCE ONE   Administration














<Malik De Guzman - Last Filed: 05/10/18 16:06>





- LABORATORY


CBC & Chemistry Diagram: 


 05/10/18 11:10





 05/10/18 11:10





- ADDITIONAL ORDERS


Additional order review: 


 











  05/10/18





  11:10


 


RBC  3.20 L


 


MCV  105.1 H


 


MCHC  29.7 L


 


RDW  19.4 H


 


MPV  9.2  D


 


Neutrophils %  No Result Required.


 


Lymphocytes %  No Result Required.














- RADIOLOGY


Radiology Studies Ordered: 














 Category Date Time Status


 


 CHEST X-RAY PORTABLE* [RAD] Stat Radiology  05/10/18 10:10 Completed














<Efrain Felix - Last Filed: 05/10/18 18:20>





Medical Decision Making





- Medical Decision Making








05/10/18 14:12


Called Dr. Maida ford @11: 26am. Dr. Gavi Montes covering. Second page 

to Dr. Gavi Montes @12:00pm. 


Dr. Gavi Montes called back @12:18pm. Case discussed. 








<Malik De Guzman - Last Filed: 05/10/18 16:06>





- Critical Care Time


Total Critical Care Time (minutes): 60


Critical Care Statement: The care of this patient involved high complexity 

decision making to prevent further life threatening deterioration of the patient

's condition and/or to evaluate & treat vital organ system(s) failure or risk 

of failure.





- Medical Decision Making





05/10/18 11:40





A portion of this note was documented by scribe services under my direction. I 

have reviewed the details of the note, within reason, and agree with the 

documentation with the following case summary and management plan written by 

me. 





Patient treated in the ED.





Patient arrives by ambulance to the emergency department.


Nursing notes are reviewed and incorporated into the medical decision-making.


Vital signs reviewed.





Peripheral IV access obtained by the nurse, laboratory studies are drawn and 

sent, reviewed and interpreted by myself. 





 Vital Signs











Temp Pulse Resp BP Pulse Ox


 


 102.5 F H     21   00/00    


 


 05/10/18 10:40     05/10/18 10:15  05/10/18 10:40   








62-year-old female with past medical history of hypertension, DVT, end-stage 

kidney disease on dialysis, pulmonary embolism, anemia, GERD, seizure disorder, 

stage IV sacral ulcer, osteomyelitis brought in by EMS in extremis with 

respiratory failure. According to the nursing home, the patient started to have 

difficulty breathing and hypoxia. EMS was activated and according to the medics

, the patient had intubated patient with a 7.0 ET tube at 22 at the lip. The 

patient was full code. Right intraosseous proximal tibial line was placed by 

the medics. Patient was brought to the ER with pulses. Upon arrival to the ED, 

the patient was seen immediately by us. The patient was noted to be febrile at 

102 and with low blood pressure with 60 systolic. Findings are concerning for 

septic shock and empiric orders of IV Zosyn and IV vancomycin and was ordered. 

Patient was noted to have a very foul smelling and deep stage IV sacral ulcer. 

Despite numerous her phalanges, patient had very difficult access. A second and 

third intraosseous line in the left proximal tibial and left proximal humerus 

was placed. Patient underwent cardiac arrest several times with spontaneous 

return of circulation. Patient has been treated with multiple medications 

including epinephrine, calcium chloride, sodium bicarbonate, glucose. The 

patient has been resuscitated for over an hour and eventually the patient 

remained with return of spontaneous her condition. The patient has been 

remained on 2 intravenous pressor medications including Saeid-Synephrine and 

norepinephrine. The patient's son who is a health care proxy, Sha Villela [phone], had initially made the patient code but after lengthy discussion, 

after spontaneous return second dictation, the patient has been made DNR in the 

ER. Case is discussed with Dr. Rojas who accepted the patient to the ICU. 

Dr. Montes paged.


05/10/18 12:13





The pt noted to have dark emesis. Protonix initiated.





 CBC, BMP





 05/10/18 11:10 





 05/10/18 11:10 





 CMP











Sodium  131 mmol/L (136-145)  L  05/10/18  11:10    


 


Potassium  4.8 mmol/L (3.5-5.1)   05/10/18  11:10    


 


Chloride  86 mmol/L ()  L  05/10/18  11:10    


 


Carbon Dioxide  10 mmol/L (21-32)  L  05/10/18  11:10    


 


Anion Gap  35  (8-16)  H  05/10/18  11:10    


 


BUN  32 mg/dL (7-18)  H  05/10/18  11:10    


 


Creatinine  5.0 mg/dL (0.55-1.02)  H  05/10/18  11:10    


 


Creat Clearance w eGFR  8.77  (>60)   05/10/18  11:10    


 


Random Glucose  572 mg/dL ()  H*  05/10/18  11:10    


 


Lactic Acid  22.7 mmol/L (0.0-2.0)  H*  05/10/18  11:10    


 


Calcium  10.9 mg/dL (8.5-10.1)  H  05/10/18  11:10    


 


Total Bilirubin  0.9 mg/dL (0.2-1.0)  D 05/10/18  11:10    


 


AST  1694 U/L (15-37)  H  05/10/18  11:10    


 


ALT  893 U/L (12-78)  H  05/10/18  11:10    


 


Alkaline Phosphatase  116 U/L ()   05/10/18  11:10    


 


Troponin I  0.03 ng/ml (0.00-0.05)   05/10/18  11:10    


 


Total Protein  6.1 g/dl (6.4-8.2)  L  05/10/18  11:10    


 


Albumin  1.8 g/dl (3.4-5.0)  L  05/10/18  11:10    








Chest xray confirms with ET tube placement.


05/10/18 12:23


Case accepted by Dr. Gavi Montes.











<Efrain Felix - Last Filed: 05/10/18 18:20>





*DC/Admit/Observation/Transfer





- Attestations


Scribe Attestion: 





05/10/18 16:08





Documentation prepared by Malik De Guzman, acting as medical scribe for Efrain Felix MD, MD/DO.





<Malik De Guzman - Last Filed: 05/10/18 16:06>





- Discharge Dispostion


Decision to Admit order: Yes





<Efrain Felix - Last Filed: 05/10/18 18:20>


Diagnosis at time of Disposition: 


 Septic shock, Cardiac arrest








- Discharge Dispostion


Condition at time of disposition: Guarded

## 2018-05-10 NOTE — PN
Progress Note (short form)





- Note


Progress Note: 





Came into examine patient found to be non-responsive.  


No spontaneous respirations.


Non-responsive to verbal/tactile/painful stimuli.  


No heart sounds auscultated. 


No breath sounds auscultated. 


Non-corneal/pupillary/gag reflexes appreciated.


VS:  HR 0, BP 0/0, RR 0, SpO2 0% 


Patient pronounced dead @ 2:52 p.m. on 05/10/18





Family @ bedside.  Support given.

## 2018-05-10 NOTE — HP
Admitting History and Physical





- Primary Care Physician


PCP: Maida Farley





- Admission


Chief Complaint: respiratory failure


History of Present Illness: 





62 yrs old female sent from [facility] for respiratory distress- pt 

was hypoxic and was intubated by EMS. In ER was noted to be febrile, hypotensive

-- underwent cardiac arrest in ER several times and was revived - now on 2 

pressors, received Zosyn and Vanco in ER- unresponsive on vent.


I spoke with ER attending 


family has agreed for DNR


I met with both son and - they understand pt's condition. 


PMH- ESRD on HD (M, W, F), HTN,SVC syndrome, anemia, DM, c. diff, epilepsy, 

stage IV sacral ulcer, OM, schizophrenia, GERD, PEG tube.Completed antibiotics 

for osteomyelitis sacral ulcer


History Source: Medical Record, Transfer Record


Limitations to Obtaining History: Intubated





- Past Medical History


CNS: Yes: CVA, Seizure


Cardiovascular: Yes: HTN


Renal/: Yes: Hemodialysis


Heme/Onc: Yes: Anemia


Endocrine: Yes: Diabetes Mellitus





- Past Surgical History


Past Surgical History: Yes: AV Fistula/Graft





- Smoking History


Smoking history: Unknown if ever smoked





Home Medications





- Allergies


Allergies/Adverse Reactions: 


 Allergies











Allergy/AdvReac Type Severity Reaction Status Date / Time


 


No Known Allergies Allergy   Verified 05/10/18 11:00














- Home Medications


Home Medications: 


Ambulatory Orders





Acetaminophen 650 mg GT Q6H PRN 02/02/18 


Amlodipine Besylate [Norvasc -] 2.5 mg GT DAILY 02/02/18 


Apixaban [Eliquis] 5 mg GT BID 02/02/18 


Collagenase Clostridium Hist. [Santyl -] 1 applic TP DAILY 02/02/18 


Insulin (LOG) Aspart [NovoLOG -] 0 units SQ BID 02/02/18 


Insulin NPH Hum/Reg Insulin Hm [Novolin 70-30 100 Unit/ml Vial] 8 unit SQ DAILY 

02/02/18 


Ipratropium 0.02% Nebulizer [Atrovent 0.02% Nebulizer -] 1 neb NEB Q4H 02/02/18 


Lactobacillus Acidophilus [Acidophilus] 1 each GT BID 02/02/18 


Nystatin Powder [Nystop Powder -] 60 gm TP DAILY 02/02/18 


Paricalcitol 2 mcg IV DAILY 02/02/18 


Polyvinyl Alcohol [Artificial Tears] 15 ml OP BID 02/02/18 


Sevelamer Carbonate 800 mg GT Q8H 02/02/18 


Vitamin B Complex/Folic Acid [B-Stress Capsules] 2,000 mcg GT DAILY 02/02/18 


levETIRAcetam [Keppra Oral Solution -] 500 mg GT Q12H 02/02/18 


Cefazolin 2 gm/D5w [Ancef 2 gm Premixed Ivpb -] 2 gm IV DAILY #12 ml 02/14/18 


Cefazolin 2 gm/D5w [Ancef 2 gm Premixed Ivpb -] 3 gm IV DAILY #6 ml 02/14/18 


metroNIDAZOLE [Flagyl -] 500 mg PO TID 42 Days  tablet 02/14/18 











Review of Systems


Unable to obtain ROS, reason: intubated





- Review of Systems


Constitutional: reports: Fever





Physical Examination


Vital Signs: 


 Vital Signs











Temperature  99.8 F H  05/10/18 11:50


 


Pulse Rate  89   05/10/18 13:01


 


Respiratory Rate  19   05/10/18 12:30


 


Blood Pressure  67/58   05/10/18 13:01


 


O2 Sat by Pulse Oximetry (%)      











Constitutional: Yes: Moderate Distress


Cardiovascular: Yes: Pulse Irregular


Respiratory: Yes: Diminished, Mechanically Ventilated


Gastrointestinal: Yes: Soft, Abdomen, Obese, Hypoactive Bowel Sounds, Other (GT)


Edema: Yes


Labs: 


 CBC, BMP





 05/10/18 11:10 





 05/10/18 11:10 











Imaging





- Results


Chest X-ray: Image Reviewed


EKG: Image Reviewed





Problem List





- Problems


(1) Cardiac arrest


Code(s): I46.9 - CARDIAC ARREST, CAUSE UNSPECIFIED   





(2) Septic shock


Code(s): A41.9 - SEPSIS, UNSPECIFIED ORGANISM; R65.21 - SEVERE SEPSIS WITH 

SEPTIC SHOCK   





(3) Decubital ulcer


Code(s): L89.90 - PRESSURE ULCER OF UNSPECIFIED SITE, UNSPECIFIED STAGE   





(4) ESRD (end stage renal disease)


Code(s): N18.6 - END STAGE RENAL DISEASE   





(5) Sepsis


Code(s): A41.9 - SEPSIS, UNSPECIFIED ORGANISM   





Assessment/Plan





plan


spoke with family 


poor prognosis


supportive care


iv antibiotics


pressor support


pt is DNR